# Patient Record
Sex: MALE | Race: WHITE | NOT HISPANIC OR LATINO | URBAN - METROPOLITAN AREA
[De-identification: names, ages, dates, MRNs, and addresses within clinical notes are randomized per-mention and may not be internally consistent; named-entity substitution may affect disease eponyms.]

---

## 2019-04-04 ENCOUNTER — COMMUNICATION - HEALTHEAST (OUTPATIENT)
Dept: TELEHEALTH | Facility: CLINIC | Age: 59
End: 2019-04-04

## 2019-04-04 ENCOUNTER — OFFICE VISIT - HEALTHEAST (OUTPATIENT)
Dept: FAMILY MEDICINE | Facility: CLINIC | Age: 59
End: 2019-04-04

## 2019-04-04 ENCOUNTER — RECORDS - HEALTHEAST (OUTPATIENT)
Dept: GENERAL RADIOLOGY | Facility: CLINIC | Age: 59
End: 2019-04-04

## 2019-04-04 DIAGNOSIS — C18.9 MALIGNANT NEOPLASM OF COLON, UNSPECIFIED PART OF COLON (H): ICD-10-CM

## 2019-04-04 DIAGNOSIS — M25.512 ACUTE PAIN OF LEFT SHOULDER: ICD-10-CM

## 2019-04-04 DIAGNOSIS — M25.512 PAIN IN LEFT SHOULDER: ICD-10-CM

## 2019-04-04 DIAGNOSIS — M25.532 PAIN IN LEFT WRIST: ICD-10-CM

## 2019-04-04 DIAGNOSIS — M79.642 PAIN IN LEFT HAND: ICD-10-CM

## 2019-04-04 DIAGNOSIS — M79.642 PAIN OF LEFT HAND: ICD-10-CM

## 2019-04-04 DIAGNOSIS — M25.532 LEFT WRIST PAIN: ICD-10-CM

## 2019-04-04 ASSESSMENT — MIFFLIN-ST. JEOR: SCORE: 1404.59

## 2019-07-09 ENCOUNTER — RECORDS - HEALTHEAST (OUTPATIENT)
Dept: LAB | Facility: CLINIC | Age: 59
End: 2019-07-09

## 2019-07-09 LAB
ALBUMIN SERPL-MCNC: 3.4 G/DL (ref 3.5–5)
ALP SERPL-CCNC: 110 U/L (ref 45–120)
ALT SERPL W P-5'-P-CCNC: <9 U/L (ref 0–45)
ANION GAP SERPL CALCULATED.3IONS-SCNC: 17 MMOL/L (ref 5–18)
AST SERPL W P-5'-P-CCNC: 13 U/L (ref 0–40)
BILIRUB SERPL-MCNC: 0.3 MG/DL (ref 0–1)
BUN SERPL-MCNC: 16 MG/DL (ref 8–22)
CALCIUM SERPL-MCNC: 9.8 MG/DL (ref 8.5–10.5)
CCP AB SER IA-ACNC: >1200 U/ML
CHLORIDE BLD-SCNC: 102 MMOL/L (ref 98–107)
CO2 SERPL-SCNC: 16 MMOL/L (ref 22–31)
CREAT SERPL-MCNC: 0.86 MG/DL (ref 0.7–1.3)
ERYTHROCYTE [SEDIMENTATION RATE] IN BLOOD BY WESTERGREN METHOD: 66 MM/HR (ref 0–15)
GFR SERPL CREATININE-BSD FRML MDRD: >60 ML/MIN/1.73M2
GLUCOSE BLD-MCNC: 82 MG/DL (ref 70–125)
POTASSIUM BLD-SCNC: 4.4 MMOL/L (ref 3.5–5)
PROT SERPL-MCNC: 7.4 G/DL (ref 6–8)
PSA SERPL-MCNC: 0.3 NG/ML (ref 0–3.5)
SODIUM SERPL-SCNC: 135 MMOL/L (ref 136–145)
TSH SERPL DL<=0.005 MIU/L-ACNC: 1.33 UIU/ML (ref 0.3–5)
VIT B12 SERPL-MCNC: 428 PG/ML (ref 213–816)

## 2019-07-10 LAB — B BURGDOR IGG+IGM SER QL: 0.22 INDEX VALUE

## 2019-07-11 ENCOUNTER — RECORDS - HEALTHEAST (OUTPATIENT)
Dept: LAB | Facility: CLINIC | Age: 59
End: 2019-07-11

## 2019-07-11 LAB
C REACTIVE PROTEIN LHE: 6.3 MG/DL (ref 0–0.8)
IRON SATN MFR SERPL: 13 % (ref 20–50)
IRON SERPL-MCNC: 27 UG/DL (ref 42–175)
RHEUMATOID FACT SERPL-ACNC: 80.5 IU/ML (ref 0–30)
TIBC SERPL-MCNC: 214 UG/DL (ref 313–563)
TRANSFERRIN SERPL-MCNC: 171 MG/DL (ref 212–360)

## 2021-05-24 ENCOUNTER — RECORDS - HEALTHEAST (OUTPATIENT)
Dept: ADMINISTRATIVE | Facility: CLINIC | Age: 61
End: 2021-05-24

## 2021-05-27 ENCOUNTER — RECORDS - HEALTHEAST (OUTPATIENT)
Dept: ADMINISTRATIVE | Facility: CLINIC | Age: 61
End: 2021-05-27

## 2021-05-27 NOTE — PROGRESS NOTES
Assessment and Plan:   1. Acute pain of left shoulder  XR Shoulder Left 2 or More VWS    Ambulatory referral to Orthopedics   2. Left wrist pain  XR Wrist Left 3 or More VWS    Ambulatory referral to Orthopedics   3. Pain of left hand  XR Hand Left 3 or More VWS    Ambulatory referral to Orthopedics   4. Malignant neoplasm of colon, unspecified part of colon (H)       Differentials include carpal tunnel syndrome or nerve entrapment within the elbow or shoulder.  Discussed symptomatic treatment including rest, ice, NSAIDs.  Patient is not interested in pain medication today.  Will refer to orthopedics for further evaluation and possible EMG.  Provided note stating that patient was seen in clinic today.  He is content with the plan.      Subjective:     Liborio is a 58 y.o. male presenting to the clinic for concerns for left wrist and hand pain.  Patient states yesterday at work, he developed left wrist pain over lunch.  By the end of the shift, he developed hand pain.  Patient states he cannot make a fist.  His wrist appears swollen.  He denies any known injury.  He works as a .  He does have some pain within his left shoulder which developed 2 weeks ago.  He describes the pain as an intermittent ache which is exacerbated with movement.  He denies any known injury to the shoulder.  He has been icing the area and taking ibuprofen.  He does have some slight discomfort within his left upper arm.  He has numbness and tingling within his fingers, but his 5th finger is spared.     He has a history of colon cancer.  He has a colostomy bag due to this.     Review of Systems: A complete 14 point review of systems was obtained and is negative or as stated in the history of present illness.    Social History     Socioeconomic History     Marital status:      Spouse name: Not on file     Number of children: Not on file     Years of education: Not on file     Highest education level: Not on file   Occupational  "History     Not on file   Social Needs     Financial resource strain: Not on file     Food insecurity:     Worry: Not on file     Inability: Not on file     Transportation needs:     Medical: Not on file     Non-medical: Not on file   Tobacco Use     Smoking status: Current Every Day Smoker     Packs/day: 0.50     Smokeless tobacco: Never Used   Substance and Sexual Activity     Alcohol use: Yes     Alcohol/week: 14.4 oz     Types: 24 Cans of beer per week     Drug use: No     Sexual activity: Not on file     Comment:    Lifestyle     Physical activity:     Days per week: Not on file     Minutes per session: Not on file     Stress: Not on file   Relationships     Social connections:     Talks on phone: Not on file     Gets together: Not on file     Attends Evangelical service: Not on file     Active member of club or organization: Not on file     Attends meetings of clubs or organizations: Not on file     Relationship status: Not on file     Intimate partner violence:     Fear of current or ex partner: Not on file     Emotionally abused: Not on file     Physically abused: Not on file     Forced sexual activity: Not on file   Other Topics Concern     Not on file   Social History Narrative     Not on file       Active Ambulatory Problems     Diagnosis Date Noted     No Active Ambulatory Problems     Resolved Ambulatory Problems     Diagnosis Date Noted     No Resolved Ambulatory Problems     Past Medical History:   Diagnosis Date     Colon cancer (H)        Family History   Problem Relation Age of Onset     Asthma Mother      Asthma Sister      Stroke Maternal Grandfather        Objective:     /70   Pulse 70   Ht 5' 6\" (1.676 m)   Wt 141 lb 8 oz (64.2 kg)   SpO2 98%   BMI 22.84 kg/m      Patient is alert, in no obvious distress.   Skin: Warm, dry.  Lungs:  Clear to auscultation. Respirations even and unlabored.  No wheezing or rales noted.   Heart:  Regular rate and rhythm.  No " murmurs.  Musculoskeletal:  He has full ROM of his shoulders, elbows, wrists.  He is tender to palpation of the middle of the palm of his hand.  There are no areas of erythema, ecchymosis, signs of trauma.  He is unable to fully flex his 1st-5th MCP joints in order to make a fist.  He is tender to palpation of his mid wrist dorsally.  He is tender to palpation of his left shoulder AC joint.  Muscle strength of upper extremities against resistance +5/5.     LABORATORY: I ordered and personally reviewed left wrist and hand x-rays showing no obvious fracture.   I ordered and personally reviewed a left shoulder x-ray showing mild spurring inferiorly at the AC joint.  Will have radiology review.

## 2021-05-31 ENCOUNTER — RECORDS - HEALTHEAST (OUTPATIENT)
Dept: ADMINISTRATIVE | Facility: CLINIC | Age: 61
End: 2021-05-31

## 2021-06-01 ENCOUNTER — RECORDS - HEALTHEAST (OUTPATIENT)
Dept: ADMINISTRATIVE | Facility: CLINIC | Age: 61
End: 2021-06-01

## 2021-06-02 VITALS — WEIGHT: 141.5 LBS | BODY MASS INDEX: 22.74 KG/M2 | HEIGHT: 66 IN

## 2021-06-16 PROBLEM — C18.9 COLON CANCER (H): Status: ACTIVE | Noted: 2019-04-04

## 2021-06-19 NOTE — LETTER
Letter by Leny Luo CNP at      Author: Leny Luo CNP Service: -- Author Type: --    Filed:  Encounter Date: 4/4/2019 Status: (Other)         April 4, 2019     Patient: Liborio Finch   YOB: 1960   Date of Visit: 4/4/2019       To Whom it May Concern:    Liborio Finch was seen in my clinic on 4/4/2019.     If you have any questions or concerns, please don't hesitate to call.    Sincerely,         Electronically signed by Leny Luo CNP

## 2022-06-13 ENCOUNTER — TRANSCRIBE ORDERS (OUTPATIENT)
Dept: OTHER | Age: 62
End: 2022-06-13

## 2022-06-13 DIAGNOSIS — Z95.5 STENTED CORONARY ARTERY: ICD-10-CM

## 2022-06-13 DIAGNOSIS — I21.3 ST ELEVATION MYOCARDIAL INFARCTION (STEMI), UNSPECIFIED ARTERY (H): Primary | ICD-10-CM

## 2025-05-06 ENCOUNTER — HOSPITAL ENCOUNTER (INPATIENT)
Facility: CLINIC | Age: 65
LOS: 2 days | Discharge: ANOTHER HOSPITAL | End: 2025-05-08
Attending: EMERGENCY MEDICINE | Admitting: STUDENT IN AN ORGANIZED HEALTH CARE EDUCATION/TRAINING PROGRAM
Payer: COMMERCIAL

## 2025-05-06 ENCOUNTER — APPOINTMENT (OUTPATIENT)
Dept: RADIOLOGY | Facility: CLINIC | Age: 65
End: 2025-05-06
Attending: EMERGENCY MEDICINE
Payer: COMMERCIAL

## 2025-05-06 ENCOUNTER — HOSPITAL ENCOUNTER (EMERGENCY)
Facility: HOSPITAL | Age: 65
End: 2025-05-06

## 2025-05-06 DIAGNOSIS — Z71.89 OSTOMY NURSE CONSULTATION: ICD-10-CM

## 2025-05-06 DIAGNOSIS — I20.0 UNSTABLE ANGINA PECTORIS (H): Primary | ICD-10-CM

## 2025-05-06 DIAGNOSIS — I20.0 UNSTABLE ANGINA (H): ICD-10-CM

## 2025-05-06 PROBLEM — M06.9 RHEUMATOID ARTHRITIS, ADULT (H): Status: ACTIVE | Noted: 2022-05-29

## 2025-05-06 PROBLEM — E78.5 DYSLIPIDEMIA: Status: ACTIVE | Noted: 2022-06-20

## 2025-05-06 PROBLEM — H93.13 BILATERAL TINNITUS: Status: ACTIVE | Noted: 2022-05-29

## 2025-05-06 PROBLEM — Z93.3 COLOSTOMY STATUS (H): Status: ACTIVE | Noted: 2023-05-19

## 2025-05-06 PROBLEM — Z85.038 HISTORY OF COLON CANCER: Status: ACTIVE | Noted: 2022-05-29

## 2025-05-06 PROBLEM — I25.2 HISTORY OF ST ELEVATION MYOCARDIAL INFARCTION (STEMI): Status: ACTIVE | Noted: 2022-05-29

## 2025-05-06 PROBLEM — R51.9 FREQUENT HEADACHES: Status: ACTIVE | Noted: 2022-05-29

## 2025-05-06 PROBLEM — I25.10 CORONARY ATHEROSCLEROSIS: Status: ACTIVE | Noted: 2022-06-20

## 2025-05-06 PROBLEM — I73.9 PERIPHERAL ARTERIAL DISEASE: Status: ACTIVE | Noted: 2022-08-31

## 2025-05-06 PROBLEM — Z95.5 STENTED CORONARY ARTERY: Status: ACTIVE | Noted: 2022-05-29

## 2025-05-06 LAB
ALBUMIN SERPL BCG-MCNC: 4.3 G/DL (ref 3.5–5.2)
ALP SERPL-CCNC: 109 U/L (ref 40–150)
ALT SERPL W P-5'-P-CCNC: 10 U/L (ref 0–70)
ANION GAP SERPL CALCULATED.3IONS-SCNC: 13 MMOL/L (ref 7–15)
AST SERPL W P-5'-P-CCNC: 21 U/L (ref 0–45)
BASOPHILS # BLD AUTO: 0.1 10E3/UL (ref 0–0.2)
BASOPHILS NFR BLD AUTO: 1 %
BILIRUB SERPL-MCNC: 0.3 MG/DL
BUN SERPL-MCNC: 14.7 MG/DL (ref 8–23)
CALCIUM SERPL-MCNC: 8.9 MG/DL (ref 8.8–10.4)
CHLORIDE SERPL-SCNC: 104 MMOL/L (ref 98–107)
CREAT SERPL-MCNC: 1.01 MG/DL (ref 0.67–1.17)
CRP SERPL-MCNC: 7.25 MG/L
EGFRCR SERPLBLD CKD-EPI 2021: 84 ML/MIN/1.73M2
EOSINOPHIL # BLD AUTO: 0.2 10E3/UL (ref 0–0.7)
EOSINOPHIL NFR BLD AUTO: 3 %
ERYTHROCYTE [DISTWIDTH] IN BLOOD BY AUTOMATED COUNT: 15 % (ref 10–15)
ERYTHROCYTE [DISTWIDTH] IN BLOOD BY AUTOMATED COUNT: 15.2 % (ref 10–15)
GLUCOSE SERPL-MCNC: 114 MG/DL (ref 70–99)
HCO3 SERPL-SCNC: 20 MMOL/L (ref 22–29)
HCT VFR BLD AUTO: 36.4 % (ref 40–53)
HCT VFR BLD AUTO: 36.5 % (ref 40–53)
HGB BLD-MCNC: 12.2 G/DL (ref 13.3–17.7)
HGB BLD-MCNC: 12.5 G/DL (ref 13.3–17.7)
IMM GRANULOCYTES # BLD: 0 10E3/UL
IMM GRANULOCYTES NFR BLD: 1 %
LYMPHOCYTES # BLD AUTO: 1.4 10E3/UL (ref 0.8–5.3)
LYMPHOCYTES NFR BLD AUTO: 23 %
MAGNESIUM SERPL-MCNC: 1.9 MG/DL (ref 1.7–2.3)
MCH RBC QN AUTO: 30.6 PG (ref 26.5–33)
MCH RBC QN AUTO: 31 PG (ref 26.5–33)
MCHC RBC AUTO-ENTMCNC: 33.5 G/DL (ref 31.5–36.5)
MCHC RBC AUTO-ENTMCNC: 34.2 G/DL (ref 31.5–36.5)
MCV RBC AUTO: 91 FL (ref 78–100)
MCV RBC AUTO: 91 FL (ref 78–100)
MONOCYTES # BLD AUTO: 0.4 10E3/UL (ref 0–1.3)
MONOCYTES NFR BLD AUTO: 7 %
NEUTROPHILS # BLD AUTO: 3.8 10E3/UL (ref 1.6–8.3)
NEUTROPHILS NFR BLD AUTO: 65 %
NRBC # BLD AUTO: 0 10E3/UL
NRBC BLD AUTO-RTO: 0 /100
PLATELET # BLD AUTO: 149 10E3/UL (ref 150–450)
PLATELET # BLD AUTO: 158 10E3/UL (ref 150–450)
POTASSIUM SERPL-SCNC: 3.6 MMOL/L (ref 3.4–5.3)
PROT SERPL-MCNC: 7.2 G/DL (ref 6.4–8.3)
RBC # BLD AUTO: 3.99 10E6/UL (ref 4.4–5.9)
RBC # BLD AUTO: 4.03 10E6/UL (ref 4.4–5.9)
SODIUM SERPL-SCNC: 137 MMOL/L (ref 135–145)
TROPONIN T SERPL HS-MCNC: 7 NG/L
TROPONIN T SERPL HS-MCNC: 8 NG/L
TSH SERPL DL<=0.005 MIU/L-ACNC: 4.08 UIU/ML (ref 0.3–4.2)
WBC # BLD AUTO: 5.9 10E3/UL (ref 4–11)
WBC # BLD AUTO: 6.8 10E3/UL (ref 4–11)

## 2025-05-06 PROCEDURE — 82565 ASSAY OF CREATININE: CPT | Performed by: STUDENT IN AN ORGANIZED HEALTH CARE EDUCATION/TRAINING PROGRAM

## 2025-05-06 PROCEDURE — 96361 HYDRATE IV INFUSION ADD-ON: CPT

## 2025-05-06 PROCEDURE — 250N000013 HC RX MED GY IP 250 OP 250 PS 637

## 2025-05-06 PROCEDURE — 250N000011 HC RX IP 250 OP 636

## 2025-05-06 PROCEDURE — 84443 ASSAY THYROID STIM HORMONE: CPT | Performed by: STUDENT IN AN ORGANIZED HEALTH CARE EDUCATION/TRAINING PROGRAM

## 2025-05-06 PROCEDURE — 86140 C-REACTIVE PROTEIN: CPT

## 2025-05-06 PROCEDURE — 83735 ASSAY OF MAGNESIUM: CPT | Performed by: STUDENT IN AN ORGANIZED HEALTH CARE EDUCATION/TRAINING PROGRAM

## 2025-05-06 PROCEDURE — 93005 ELECTROCARDIOGRAM TRACING: CPT | Performed by: EMERGENCY MEDICINE

## 2025-05-06 PROCEDURE — 120N000004 HC R&B MS OVERFLOW

## 2025-05-06 PROCEDURE — 71045 X-RAY EXAM CHEST 1 VIEW: CPT

## 2025-05-06 PROCEDURE — 99291 CRITICAL CARE FIRST HOUR: CPT | Mod: 25

## 2025-05-06 PROCEDURE — 85025 COMPLETE CBC W/AUTO DIFF WBC: CPT | Performed by: STUDENT IN AN ORGANIZED HEALTH CARE EDUCATION/TRAINING PROGRAM

## 2025-05-06 PROCEDURE — 250N000011 HC RX IP 250 OP 636: Mod: JZ | Performed by: EMERGENCY MEDICINE

## 2025-05-06 PROCEDURE — 96375 TX/PRO/DX INJ NEW DRUG ADDON: CPT

## 2025-05-06 PROCEDURE — 250N000013 HC RX MED GY IP 250 OP 250 PS 637: Performed by: STUDENT IN AN ORGANIZED HEALTH CARE EDUCATION/TRAINING PROGRAM

## 2025-05-06 PROCEDURE — 36415 COLL VENOUS BLD VENIPUNCTURE: CPT

## 2025-05-06 PROCEDURE — 258N000003 HC RX IP 258 OP 636: Performed by: EMERGENCY MEDICINE

## 2025-05-06 PROCEDURE — 96374 THER/PROPH/DIAG INJ IV PUSH: CPT

## 2025-05-06 PROCEDURE — 85041 AUTOMATED RBC COUNT: CPT

## 2025-05-06 PROCEDURE — 84484 ASSAY OF TROPONIN QUANT: CPT | Performed by: STUDENT IN AN ORGANIZED HEALTH CARE EDUCATION/TRAINING PROGRAM

## 2025-05-06 PROCEDURE — 36415 COLL VENOUS BLD VENIPUNCTURE: CPT | Performed by: STUDENT IN AN ORGANIZED HEALTH CARE EDUCATION/TRAINING PROGRAM

## 2025-05-06 RX ORDER — METOCLOPRAMIDE HYDROCHLORIDE 5 MG/ML
10 INJECTION INTRAMUSCULAR; INTRAVENOUS ONCE
Status: COMPLETED | OUTPATIENT
Start: 2025-05-06 | End: 2025-05-06

## 2025-05-06 RX ORDER — LIDOCAINE 40 MG/G
CREAM TOPICAL
Status: CANCELLED | OUTPATIENT
Start: 2025-05-06

## 2025-05-06 RX ORDER — ACETAMINOPHEN 325 MG/1
975 TABLET ORAL EVERY 8 HOURS PRN
Status: CANCELLED | OUTPATIENT
Start: 2025-05-06

## 2025-05-06 RX ORDER — ASPIRIN 325 MG
325 TABLET ORAL ONCE
Status: COMPLETED | OUTPATIENT
Start: 2025-05-06 | End: 2025-05-06

## 2025-05-06 RX ORDER — MAGNESIUM OXIDE 400 MG/1
400 TABLET ORAL DAILY
COMMUNITY

## 2025-05-06 RX ORDER — POLYETHYLENE GLYCOL 3350 17 G/17G
17 POWDER, FOR SOLUTION ORAL 2 TIMES DAILY PRN
Status: CANCELLED | OUTPATIENT
Start: 2025-05-06

## 2025-05-06 RX ORDER — DIPHENHYDRAMINE HYDROCHLORIDE 50 MG/ML
12.5 INJECTION, SOLUTION INTRAMUSCULAR; INTRAVENOUS ONCE
Status: COMPLETED | OUTPATIENT
Start: 2025-05-06 | End: 2025-05-06

## 2025-05-06 RX ORDER — NITROGLYCERIN 0.4 MG/1
0.4 TABLET SUBLINGUAL EVERY 5 MIN PRN
Status: DISCONTINUED | OUTPATIENT
Start: 2025-05-06 | End: 2025-05-06

## 2025-05-06 RX ORDER — SULFASALAZINE 500 MG/1
1500 TABLET ORAL 2 TIMES DAILY
Status: CANCELLED | OUTPATIENT
Start: 2025-05-06

## 2025-05-06 RX ORDER — CLOPIDOGREL BISULFATE 75 MG/1
75 TABLET ORAL DAILY
COMMUNITY

## 2025-05-06 RX ORDER — LOSARTAN POTASSIUM 25 MG/1
25 TABLET ORAL DAILY
Status: DISCONTINUED | OUTPATIENT
Start: 2025-05-06 | End: 2025-05-08 | Stop reason: HOSPADM

## 2025-05-06 RX ORDER — ASPIRIN 81 MG/1
81 TABLET ORAL DAILY
Status: CANCELLED | OUTPATIENT
Start: 2025-05-07

## 2025-05-06 RX ORDER — SULFASALAZINE 500 MG/1
1500 TABLET ORAL 2 TIMES DAILY
Status: DISCONTINUED | OUTPATIENT
Start: 2025-05-06 | End: 2025-05-08 | Stop reason: HOSPADM

## 2025-05-06 RX ORDER — CLOPIDOGREL BISULFATE 75 MG/1
75 TABLET ORAL DAILY
Status: CANCELLED | OUTPATIENT
Start: 2025-05-07

## 2025-05-06 RX ORDER — ASPIRIN 81 MG/1
81 TABLET ORAL DAILY
COMMUNITY

## 2025-05-06 RX ORDER — MAGNESIUM OXIDE 400 MG/1
400 TABLET ORAL DAILY
Status: CANCELLED | OUTPATIENT
Start: 2025-05-07

## 2025-05-06 RX ORDER — SULFASALAZINE 500 MG/1
1500 TABLET ORAL 2 TIMES DAILY
COMMUNITY

## 2025-05-06 RX ORDER — HEPARIN SODIUM 10000 [USP'U]/100ML
0-5000 INJECTION, SOLUTION INTRAVENOUS CONTINUOUS
Status: DISCONTINUED | OUTPATIENT
Start: 2025-05-06 | End: 2025-05-08 | Stop reason: HOSPADM

## 2025-05-06 RX ORDER — NITROGLYCERIN 0.4 MG/1
0.4 TABLET SUBLINGUAL ONCE
Status: COMPLETED | OUTPATIENT
Start: 2025-05-06 | End: 2025-05-06

## 2025-05-06 RX ORDER — AMOXICILLIN 250 MG
2 CAPSULE ORAL 2 TIMES DAILY PRN
Status: CANCELLED | OUTPATIENT
Start: 2025-05-06

## 2025-05-06 RX ORDER — CALCIUM CARBONATE 500 MG/1
1000 TABLET, CHEWABLE ORAL 4 TIMES DAILY PRN
Status: CANCELLED | OUTPATIENT
Start: 2025-05-06

## 2025-05-06 RX ORDER — AMOXICILLIN 250 MG
1 CAPSULE ORAL 2 TIMES DAILY PRN
Status: CANCELLED | OUTPATIENT
Start: 2025-05-06

## 2025-05-06 RX ADMIN — SODIUM CHLORIDE 500 ML: 0.9 INJECTION, SOLUTION INTRAVENOUS at 12:40

## 2025-05-06 RX ADMIN — SULFASALAZINE 1500 MG: 500 TABLET ORAL at 22:27

## 2025-05-06 RX ADMIN — NITROGLYCERIN 0.4 MG: 0.4 TABLET SUBLINGUAL at 12:27

## 2025-05-06 RX ADMIN — HEPARIN SODIUM 750 UNITS/HR: 10000 INJECTION, SOLUTION INTRAVENOUS at 19:16

## 2025-05-06 RX ADMIN — LOSARTAN POTASSIUM 25 MG: 25 TABLET, FILM COATED ORAL at 21:22

## 2025-05-06 RX ADMIN — DIPHENHYDRAMINE HYDROCHLORIDE 12.5 MG: 50 INJECTION, SOLUTION INTRAMUSCULAR; INTRAVENOUS at 12:43

## 2025-05-06 RX ADMIN — NITROGLYCERIN 0.4 MG: 0.4 TABLET, ORALLY DISINTEGRATING SUBLINGUAL at 19:17

## 2025-05-06 RX ADMIN — ASPIRIN 325 MG ORAL TABLET 325 MG: 325 PILL ORAL at 12:28

## 2025-05-06 RX ADMIN — METOCLOPRAMIDE 10 MG: 5 INJECTION, SOLUTION INTRAMUSCULAR; INTRAVENOUS at 12:43

## 2025-05-06 ASSESSMENT — ACTIVITIES OF DAILY LIVING (ADL)
ADLS_ACUITY_SCORE: 41
ADLS_ACUITY_SCORE: 42
ADLS_ACUITY_SCORE: 41
ADLS_ACUITY_SCORE: 41
ADLS_ACUITY_SCORE: 42

## 2025-05-06 ASSESSMENT — COLUMBIA-SUICIDE SEVERITY RATING SCALE - C-SSRS
1. IN THE PAST MONTH, HAVE YOU WISHED YOU WERE DEAD OR WISHED YOU COULD GO TO SLEEP AND NOT WAKE UP?: NO
6. HAVE YOU EVER DONE ANYTHING, STARTED TO DO ANYTHING, OR PREPARED TO DO ANYTHING TO END YOUR LIFE?: NO
2. HAVE YOU ACTUALLY HAD ANY THOUGHTS OF KILLING YOURSELF IN THE PAST MONTH?: NO

## 2025-05-06 NOTE — ED PROVIDER NOTES
Emergency Department Midlevel Supervisory Note     I personally saw the patient and performed a substantive portion of the visit including all aspects of the medical decision making.    ED Course:  12:15 PM Tanvi Dumont PA-C staffed patient with me. I agree with their assessment and plan of management, and I will see the patient.  12:20 PM I met with the patient to introduce myself, gather additional history, perform my initial exam, and discuss the plan.     Brief HPI:     Liboiro Finch is a 64 year old male who presents for evaluation of chest pain. Chest pain present for the past 24 hours. He has not been taking his home medications except for Plavix.     I, Jeane Miller, am serving as a scribe to document services personally performed by Charles Cook MD, based on my observations and the provider's statements to me.   I, Charles Cook MD, attest that Jeane Miller was acting in a scribe capacity, has observed my performance of the services and has documented them in accordance with my direction.    Brief Physical Exam:  Constitutional:  Alert, in no acute distress  EYES: Conjunctivae clear  HENT:  Atraumatic, normocephalic  Respiratory:  Respirations even, unlabored, in no acute respiratory distress  Cardiovascular:  Regular rate and rhythm, good peripheral perfusion  GI: Soft, nondistended, nontender, no palpable masses, no rebound, no guarding   Musculoskeletal:  No edema. No cyanosis. Range of motion major extremities intact.    Integument: Warm, Dry, No erythema, No rash.   Neurologic:  Alert & oriented, no focal deficits noted  Psych: Normal mood and affect     MDM:  ED Course as of 05/06/25 1545   Tue May 06, 2025   1215 JUSTIN supervisory note: 63 yo M who presents with chest pain for about 24 hours. Not taking home meds aside from plavix. Hypertensive on arrival at 194/93.  EKG shows mild ST depression in leads V3 V4, no ST elevation.  Prior EKG from earlier today at [facility] was also  reviewed and shows slightly more pronounced ST depression in these leads.  High suspicion for ACS at this time.   1341 Chest pain decreased from 3 down to 1 after dose of nitroglycerin.   1342 EKG today shows sinus rhythm rate of 75.  Faint ST depression in V3, V4 without reciprocal ST elevation.  Normal axis, normal intervals.  When compared to prior EKG in our system on August 10, 2000, these changes in lead V3 V4 are new.  Impression: Sinus rhythm with nonspecific ST changes anterior leads.   1342 When reviewing patient's EKG from earlier today at [facility], the mild ST depression in V3 and V4 more pronounced.  I will add image to the chart.   3:45 PM I spoke to Dr. Beard with cardiology. Pt's presentation is borderline but concerning for ACS/unstable angina. Plan to admit to facility with cath lab. Start heparin if chest pain returns.        1. Unstable angina (H)        Labs and Imaging:  Results for orders placed or performed during the hospital encounter of 05/06/25   XR Chest Port 1 View    Impression    IMPRESSION:     Heart size is normal. Lungs are clear bilaterally. Mediastinum and visualized bony structures are unremarkable.    Result Value Ref Range    Troponin T, High Sensitivity 8 <=22 ng/L   Result Value Ref Range    Magnesium 1.9 1.7 - 2.3 mg/dL   Comprehensive metabolic panel   Result Value Ref Range    Sodium 137 135 - 145 mmol/L    Potassium 3.6 3.4 - 5.3 mmol/L    Carbon Dioxide (CO2) 20 (L) 22 - 29 mmol/L    Anion Gap 13 7 - 15 mmol/L    Urea Nitrogen 14.7 8.0 - 23.0 mg/dL    Creatinine 1.01 0.67 - 1.17 mg/dL    GFR Estimate 84 >60 mL/min/1.73m2    Calcium 8.9 8.8 - 10.4 mg/dL    Chloride 104 98 - 107 mmol/L    Glucose 114 (H) 70 - 99 mg/dL    Alkaline Phosphatase 109 40 - 150 U/L    AST 21 0 - 45 U/L    ALT 10 0 - 70 U/L    Protein Total 7.2 6.4 - 8.3 g/dL    Albumin 4.3 3.5 - 5.2 g/dL    Bilirubin Total 0.3 <=1.2 mg/dL   TSH with free T4 reflex   Result Value Ref Range    TSH 4.08 0.30 -  4.20 uIU/mL   CBC with platelets and differential   Result Value Ref Range    WBC Count 5.9 4.0 - 11.0 10e3/uL    RBC Count 3.99 (L) 4.40 - 5.90 10e6/uL    Hemoglobin 12.2 (L) 13.3 - 17.7 g/dL    Hematocrit 36.4 (L) 40.0 - 53.0 %    MCV 91 78 - 100 fL    MCH 30.6 26.5 - 33.0 pg    MCHC 33.5 31.5 - 36.5 g/dL    RDW 15.2 (H) 10.0 - 15.0 %    Platelet Count 149 (L) 150 - 450 10e3/uL    % Neutrophils 65 %    % Lymphocytes 23 %    % Monocytes 7 %    % Eosinophils 3 %    % Basophils 1 %    % Immature Granulocytes 1 %    NRBCs per 100 WBC 0 <1 /100    Absolute Neutrophils 3.8 1.6 - 8.3 10e3/uL    Absolute Lymphocytes 1.4 0.8 - 5.3 10e3/uL    Absolute Monocytes 0.4 0.0 - 1.3 10e3/uL    Absolute Eosinophils 0.2 0.0 - 0.7 10e3/uL    Absolute Basophils 0.1 0.0 - 0.2 10e3/uL    Absolute Immature Granulocytes 0.0 <=0.4 10e3/uL    Absolute NRBCs 0.0 10e3/uL   Result Value Ref Range    Troponin T, High Sensitivity 7 <=22 ng/L     I have reviewed the relevant laboratory and radiology studies    Procedures:  I was present for the key portions of this procedure: none      Critical Care     Performed by: Dr Linwood Cook  Authorized by: Dr Linwood Cook  Total critical care time: 60 minutes  Critical care was necessary to treat or prevent imminent or life-threatening deterioration of the following conditions: unstable angina/ACS requiring close cardiovascular monitoring, repeat ECGs and transfer to higher level of care.    Critical care was time spent personally by me on the following activities: development of treatment plan with patient or surrogate, discussions with consultants, examination of patient, evaluation of patient's response to treatment, obtaining history from patient or surrogate, ordering and performing treatments and interventions, ordering and review of laboratory studies, ordering and review of radiographic studies, re-evaluation of patient's condition and monitoring for potential decompensation.  Critical care  time was exclusive of separately billable procedures and treating other patients.      Charles Cook MD  [facility] EMERGENCY ROOM  [address] [address] [phone]       Charles Cook MD  05/06/25 1642

## 2025-05-06 NOTE — H&P
[facility]    History and Physical - Hospitalist Service       Date of Admission:  5/6/2025    Assessment & Plan      Liborio Finch is a 64 year old male who has a history of CAD, STEMI 2022 s/p PCI LAD, ischemic cardiomyopathy LVEF greater than 60% 5/2023, HTN, HLD, PAD s/p left femoral endarterectomy and iliac stent 10/2022, RA, colon cancer s/p colostomy in remission, former tobacco use, and is admitted for unstable angina.    Unstable angina  CAD  STEMI 2022 s/p PCI LAD  Ischemic cardiomyopathy LVEF >60% 5/2023  HTN  HLD  Presented to urgent care with 3 days of chest pain, EKG showed ST depression V3 V4 however this was not appreciated on EKG performed in the ED.  ED provider spoke with cardiologist Dr. Beard who agreed there is a risk that symptoms are consistent with unstable angina would recommend coronary angiography at [facility].  No beds available, will admit to [facility] and await bed availability.  Should chest pain return, he recommended starting heparin GTT.   Of note, patient stopped taking his antihypertensives a few months ago which included losartan 25 mg metoprolol succinate 25 mg.  Lipid panel 1/2025 with , ASCVD 10yr risk w/o HTN treatment 18.3%. Would benefit from high intensity statin. Heart Score of 4.  - Admit to inpatient  - Cardiology consulted, appreciate expertise  - Plan to transfer to [facility] for coronary angio when bed is available  - Continue PTA Plavix, ASA  - Echocardiogram  - UA for evidence of EOD  - NTG sublingual as needed  - Heparin GTT  - Cardiac tele  - n.p.o. midnight  - Restart losartan 25mg      LLE pain  PAD s/p left femoral endarterectomy and iliac stent  - US duplex LLE, eval for DVT      Chronic conditions:  RA - Continue PTA sulfasalazine  Colon cancer s/p colostomy in remission - [facility]OC consult          Diet: 2 Gram Sodium Diet  DVT Prophylaxis: Heparin GTT  Garibay Catheter: Not present  Fluids: PO  Lines: None     Cardiac  Monitoring: Cardiac tele  Code Status: No CPR- Do NOT Intubate    Clinically Significant Risk Factors Present on Admission                 # Drug Induced Platelet Defect: home medication list includes an antiplatelet medication                        Disposition Plan      Expected Discharge Date: 05/07/2025                The patient's care was discussed with the Attending Physician, Dr. Lamine Diop . The patient will be seen in the morning by Dr. Christi Yarbrough.       Tod Landon, DO  Hospitalist Service  [facility]  Securely message with [employer] (more info)  Text page via [employer] Paging/Directory   ______________________________________________________________________    Chief Complaint   Chest pain    History is obtained from the patient    History of Present Illness   Liborio Finch is a 64 year old male who has a history of CAD, STEMI 2022 s/p PCI LAD, ischemic cardiomyopathy LVEF greater than 60% 5/2023, HTN, HLD, PAD s/p left femoral endarterectomy and iliac stent 10/2022, RA, colon cancer s/p colostomy in remission, former tobacco use, and is admitted for unstable angina.    Patient was helping his grandson move on 5/3, and at the end of the day felt malaise which progressed into a left-sided chest pain.  Chest pain has been constant in nature, he is unsure if it has been worsening with activity.  Does not radiate.  No fevers or chills.  No cough or shortness of breath.  This morning his left lower extremity pain had worsened beyond his baseline, and he was planning to discuss this with his primary care doctor and potentially get an ultrasound.  His chest pain had persisted and he was concerned that he would be having another heart attack so he decided to present to urgent care.  EKG performed in urgent care showed ST depression in V3, questionably in V4, they recommended he present to the ED.    He works as a  and lives with [place] with his wife.  Ambulatory at  baseline without assistive devices.  Non-smoker (quit 05/2022), no alcohol use.      Past Medical History    No past medical history on file.    Past Surgical History   Past Surgical History:   Procedure Laterality Date    APPENDECTOMY      COLON SURGERY      IR LOWER EXTREMITY ANGIOGRAM LEFT  10/13/2022       Prior to Admission Medications   Prior to Admission Medications   Prescriptions Last Dose Informant Patient Reported? Taking?   aspirin 81 MG EC tablet 5/6/2025 Morning  Yes Yes   Sig: Take 81 mg by mouth daily.   clopidogrel (PLAVIX) 75 MG tablet 5/6/2025 Morning  Yes Yes   Sig: Take 75 mg by mouth daily.   magnesium oxide (MAG-OX) 400 MG tablet 5/6/2025 Morning  Yes Yes   Sig: Take 400 mg by mouth daily.   omeprazole (PRILOSEC) 20 MG capsule 5/6/2025 Morning  Yes Yes   Sig: [OMEPRAZOLE (PRILOSEC) 20 MG CAPSULE] Take 20 mg by mouth daily before breakfast.   sulfaSALAzine (AZULFIDINE) 500 MG tablet 5/6/2025 Morning  Yes Yes   Sig: Take 1,500 mg by mouth 2 times daily.      Facility-Administered Medications: None           Physical Exam   Vital Signs: Temp: 98.3  F (36.8  C) Temp src: Oral BP: (!) 164/77 Pulse: 59   Resp: 19 SpO2: 97 % O2 Device: None (Room air)    Weight: 140 lbs 0 oz    Constitutional: Awake, alert, cooperative, no apparent distress, and appears stated age.  Eyes: Lids and lashes normal, extra ocular muscles intact, sclera clear, conjunctiva normal.  ENT: Normocephalic, atraumatic. Moist mucous membranes.  Hematologic / Lymphatic: No cervical lymphadenopathy.  Respiratory: No increased work of breathing, no accessory muscle use, clear to auscultation bilaterally, no crackles or wheezing.  Cardiovascular: Regular rate and rhythm, normal S1 and S2, no S3 or S4, and no murmur noted  GI: Abdomen soft, non-tender, non-distended, no masses palpated.  Skin: No bruising or bleeding and normal skin color, texture, turgor.  Musculoskeletal: Generalized LLE (lower leg) tenderness to palpation, no edema  or erythema.   Neurologic: Awake, alert, oriented to name, place and time. Sensation intact bilaterally.      Medical Decision Making     Please see A&P for additional details of medical decision making.      Data   ------------------------- PAST 24 HR DATA REVIEWED -----------------------------------------------    I have personally reviewed the following data over the past 24 hrs:    5.9  \   12.2 (L)   / 149 (L)     137 104 14.7 /  114 (H)   3.6 20 (L) 1.01 \     ALT: 10 AST: 21 AP: 109 TBILI: 0.3   ALB: 4.3 TOT PROTEIN: 7.2 LIPASE: N/A     Trop: 7 BNP: N/A     TSH: 4.08 T4: N/A A1C: N/A       Imaging results reviewed over the past 24 hrs:   Recent Results (from the past 24 hours)   XR Chest Port 1 View    Narrative    EXAM: XR CHEST PORT 1 VIEW  LOCATION: [facility]  DATE: 5/6/2025    INDICATION: chest pain  COMPARISON: 5/5/2023      Impression    IMPRESSION:     Heart size is normal. Lungs are clear bilaterally. Mediastinum and visualized bony structures are unremarkable.

## 2025-05-06 NOTE — ED NOTES
"[facility] ED Handoff Report    ED Chief Complaint: Chest pain    ED Diagnosis:  (I20.0) Unstable angina (H)  Comment: Was given one dose of sublingual nitroglycerin. CP returned this evening  Plan: Heparin drip. Cardiology consult. [facility] for coronary angio when bed is available.        PMH:  CAD, STEMI 2022 s/p PCI LAD, ischemic cardiomyopathy LVEF greater than 60% 5/2023, HTN, HLD, PAD s/p left femoral endarterectomy and iliac stent 10/2022, RA, colon cancer s/p colostomy in remission, former tobacco use, and is admitted for unstable angina     Code Status:  No Order     Falls Risk: No Band: Not applicable    Current Living Situation/Residence: lives with a significant other     Elimination Status: Continent: Yes     Activity Level: SBA    Patient's Preferred Language:  English     Needed: No    Vital Signs:  BP (!) 164/77   Pulse 59   Temp 98.3  F (36.8  C) (Oral)   Resp 19   Ht 1.676 m (5' 6\")   Wt 63.5 kg (140 lb)   SpO2 97%   BMI 22.60 kg/m       Cardiac Rhythm: Sinus    Pain Score: 3/10    Is the Patient Confused:  No    Last Food or Drink: 05/06/25 at 1900    Assessment and Plan of Care:  CP returned, so will be given a second dose of sublingual nitroglycerin, will start heparin drip. Plan is to transfer to [facility] for coronary angio when a bed is available. Cardiac monitoring    Tests Performed: Done: Labs and Imaging    Treatments Provided:  Sublingual nitroglycerin, cardiac monitoring, blood work    Family Dynamics/Concerns: No    Belongings Checklist Done and Signed by Patient: N/A    Belongings Sent with Patient: Clothes, phone    Boarding medications sent with patient: N/A    Additional Information: CP started on Sunday. Pt has cardiac hx, with stent placed three years ago. Carries nitroglycerin, but did not take any at home. CP improved after first dose of nitroglycerin. Pt cleared to eat, goes NPO at midnight.     RN: Ryan Gutierrez RN   5/6/2025 6:49 PM       "

## 2025-05-06 NOTE — PHARMACY-ADMISSION MEDICATION HISTORY
Pharmacist Admission Medication History    Admission medication history is complete. The information provided in this note is only as accurate as the sources available at the time of the update.    Information Source(s): Patient and CareEverywhere/SureScripts via in-person    Pertinent Information: None    Changes made to PTA medication list:  Added: Plavix, Sulfasalazine, Asa, Magnesium  Deleted: None  Changed: None    Allergies reviewed with patient and updates made in EHR: yes    Medication History Completed By: Rigoberto Ulloa RPH 5/6/2025 1:14 PM    PTA Med List   Medication Sig Last Dose/Taking    aspirin 81 MG EC tablet Take 81 mg by mouth daily. 5/6/2025 Morning    clopidogrel (PLAVIX) 75 MG tablet Take 75 mg by mouth daily. 5/6/2025 Morning    magnesium oxide (MAG-OX) 400 MG tablet Take 400 mg by mouth daily. 5/6/2025 Morning    sulfaSALAzine (AZULFIDINE) 500 MG tablet Take 1,500 mg by mouth 2 times daily. 5/6/2025 Morning    omeprazole (PRILOSEC) 20 MG capsule [OMEPRAZOLE (PRILOSEC) 20 MG CAPSULE] Take 20 mg by mouth daily before breakfast. 5/6/2025 Morning

## 2025-05-06 NOTE — ED TRIAGE NOTES
Pt reports chest pain that started on Sunday. States he has a hx of a heart attack with stent placement three years ago.      Triage Assessment (Adult)       Row Name 05/06/25 1159          Triage Assessment    Airway WDL WDL        Respiratory WDL    Respiratory WDL WDL        Skin Circulation/Temperature WDL    Skin Circulation/Temperature WDL WDL        Cardiac WDL    Cardiac WDL X;chest pain        Chest Pain Assessment    Chest Pain Location midsternal     Duration Sunday        Peripheral/Neurovascular WDL    Peripheral Neurovascular WDL WDL        Cognitive/Neuro/Behavioral WDL    Cognitive/Neuro/Behavioral WDL WDL

## 2025-05-06 NOTE — ED NOTES
Expected Patient Referral to ED  11:14 AM    Referring Clinic/Provider:  [facility]    Reason for referral/Clinical facts:  64 M with intermittent CP and SOB since Sunday. Not exertional. Has hx CAD s/p stent in 2022. Smoker.    Recommendations provided:  Send to ED for further evaluation    Caller was informed that this institution does possess the capabilities and/or resources to provide for patient and should be transferred to our facility.    Discussed that if direct admit is sought and any hurdles are encountered, this ED would be happy to see the patient and evaluate.    Informed caller that recommendations provided are recommendations based only on the facts provided and that they responsible to accept or reject the advice, or to seek a formal in person consultation as needed and that this ED will see/treat patient should they arrive.      Maye Kelly MD  [facility] EMERGENCY DEPARTMENT  [address] [place] [id number]  [phone]       Maye Kelly MD  05/06/25 8766

## 2025-05-06 NOTE — ED PROVIDER NOTES
"Emergency Department Encounter   NAME: Liborio Finch ; AGE: 64 year old male ; YOB: 1960 ; MRN: [id number] ; PCP: No primary care provider on file.   ED PROVIDER: Tanvi Dumont PA-C    Evaluation Date & Time:   5/6/2025 11:56 AM    CHIEF COMPLAINT:  Chest Pain        Impression and Plan   FINAL IMPRESSION:    ICD-10-CM    1. Unstable angina (H)  I20.0           ED Course and Medical Decision Making  Liborio Finch is a 64 year old male with a pertinent history of STEMI s/p PCI, RA, and unstable angina who presents to the ED for evaluation of chest pain. Pt states starting mid morning on Sunday (3 days ago) he started having mid/left sternal chest pressure.  Pain is not positional or pleuritic.  Pain is not made worse with exertion. He denies any radiation of pain and states this does not feel similar to previous STEMI as he had severe chest and arm pain at that time. Rates chest pain at 3/10. He was initially evaluated at [facility] urgent care and EKG showed mild ST depressions in V3 and V4 so he was sent to the ED. No meds given at urgent care.    Vitals reviewed, he is quite hypertensive with BP of 194/93. Pt was previously on antihypertensive medication but stopped almost all of his medications aside from clopidogrel as he \"did not want to be on that many medications\". Rest of vitals are unremarkable.  On exam he is resting comfortably in the bed, no acute distress. Not pale or diaphoretic. Differential diagnosis/emergent conditions considered and evaluated for includes but not limited to muscle strain, shingles, bronchitis, pneumonia, NSTEMI, unstable angina, PE, dissection. His heart rate is regular.  Radial pulses 2+ and equal bilaterally.  Lung sounds clear to auscultation. His chest pain is not reproducible on exam. No overlying skin changes of the chest.  No abdominal tenderness.  No back pain.  He was given a dose of sublingual nitroglycerin as well as 325 ASA for " symptoms.    Initial EKG upon arrival here to the emergency department shows very mild ST depressions in V3 and V4.  These are slightly improved when compared to EKG obtained in urgent care prior to arrival.  Initial troponin 8, repeat down trended to 7. No NSTEMI.  Lab work today is overall unremarkable and reassuring.  Recheck of pt after administration of nitrostat finds improvement in his chest discomfort from a 4/10 to  a 1/10.    Given patient's new mild ST depressions and improvement of chest pain with nitroglycerin his clinical picture today is concerning for unstable angina/ACS. Initial plan was to transfer patient to hospital with cardiac Cath Lab, however, no cards tele beds at hospitals with cath labs in system. Will admit here at [facility] and patient may need to be transferred to [facility] or other facility for cath if indicated. If chest pain returns, plan to start heparin.        Medical Decision Making  I obtained history from Family Member/Significant Other  I discussed the care with another health care provider: Dr. Cook, Dr. Beard Cardiology  Admit.    MIPS (CTPE, Dental pain, Garibay, Sinusitis, Asthma/COPD, Head Trauma): Not Applicable    SEPSIS: None        ED COURSE:  1202 I met and introduced myself to the patient. I gathered initial history and performed my physical exam. We discussed plan for initial workup.   1215 I have staffed the patient with Dr. Cook, ED MD, who has evaluated the patient and agrees with all aspects of today's care.   1753 I spoke with Dr. Thomas resident service here at [facility] and pt was accepted for admission    At the conclusion of the encounter I discussed the results of all the tests and the disposition. The questions were answered. The patient or family acknowledged understanding and was agreeable with the care plan.        MEDICATIONS GIVEN IN THE EMERGENCY DEPARTMENT:  Medications   aspirin (ASA) tablet 325 mg (325 mg Oral $Given 5/6/25 1228)  "  nitroGLYcerin (NITROSTAT) sublingual tablet 0.4 mg (0.4 mg Sublingual $Given 5/6/25 1227)   sodium chloride 0.9% BOLUS 500 mL (0 mLs Intravenous Stopped 5/6/25 1452)   metoclopramide (REGLAN) injection 10 mg (10 mg Intravenous $Given 5/6/25 1243)   diphenhydrAMINE (BENADRYL) injection 12.5 mg (12.5 mg Intravenous $Given 5/6/25 1243)         NEW PRESCRIPTIONS STARTED AT TODAY'S ED VISIT:  New Prescriptions    No medications on file         HPI     Liborio Finch is a 64 year old male with a pertinent history of STEMI s/p PCI, RA, and unstable angina who presents to the ED for evaluation of chest pain. Pt states starting mid morning on Sunday (3 days ago) he started having mid/left sternal chest pressure.  Pain is not positional or pleuritic.  Pain is not made worse with exertion. He denies any radiation of pain and states this does not feel similar to previous STEMI as he had severe chest and arm pain at that time. Rates chest pain at 3/10. He was initially evaluated at [facility] urgent care and EKG showed mild ST depressions in V3 and V4 so he was sent to the ED. No meds given at urgent care.        Physical Exam     First Vitals:  Patient Vitals for the past 24 hrs:   BP Temp Temp src Pulse Resp SpO2 Height Weight   05/06/25 1645 -- -- -- 59 19 97 % -- --   05/06/25 1632 (!) 164/77 -- -- 60 -- 96 % -- --   05/06/25 1247 (!) 159/81 -- -- 62 -- 96 % -- --   05/06/25 1201 (!) 194/93 98.3  F (36.8  C) Oral 76 17 98 % 1.676 m (5' 6\") 63.5 kg (140 lb)         PHYSICAL EXAM    General Appearance:  Alert, cooperative, no distress, appears stated age  HENT: Normocephalic without obvious deformity, atraumatic. Mucous membranes moist   Eyes: Conjunctiva clear, Lids normal. No discharge.   Respiratory: No distress. Lungs clear to ausculation bilaterally. No wheezes, rhonchi or stridor  Cardiovascular: Regular rate and rhythm, no murmur. Normal cap refill. No peripheral edema  GI: Abdomen soft, nontender  : No CVA " tenderness  Musculoskeletal: Moving all extremities. No gross deformities  Integument: Warm, dry, no rashes or lesions  Neurologic: Alert and orientated x3. No focal deficits.  Psych: Normal mood and affect        Results     LAB:  All pertinent labs reviewed and interpreted  Labs Ordered and Resulted from Time of ED Arrival to Time of ED Departure   COMPREHENSIVE METABOLIC PANEL - Abnormal       Result Value    Sodium 137      Potassium 3.6      Carbon Dioxide (CO2) 20 (*)     Anion Gap 13      Urea Nitrogen 14.7      Creatinine 1.01      GFR Estimate 84      Calcium 8.9      Chloride 104      Glucose 114 (*)     Alkaline Phosphatase 109      AST 21      ALT 10      Protein Total 7.2      Albumin 4.3      Bilirubin Total 0.3     CBC WITH PLATELETS AND DIFFERENTIAL - Abnormal    WBC Count 5.9      RBC Count 3.99 (*)     Hemoglobin 12.2 (*)     Hematocrit 36.4 (*)     MCV 91      MCH 30.6      MCHC 33.5      RDW 15.2 (*)     Platelet Count 149 (*)     % Neutrophils 65      % Lymphocytes 23      % Monocytes 7      % Eosinophils 3      % Basophils 1      % Immature Granulocytes 1      NRBCs per 100 WBC 0      Absolute Neutrophils 3.8      Absolute Lymphocytes 1.4      Absolute Monocytes 0.4      Absolute Eosinophils 0.2      Absolute Basophils 0.1      Absolute Immature Granulocytes 0.0      Absolute NRBCs 0.0     TROPONIN T, HIGH SENSITIVITY - Normal    Troponin T, High Sensitivity 8     MAGNESIUM - Normal    Magnesium 1.9     TSH WITH FREE T4 REFLEX - Normal    TSH 4.08     TROPONIN T, HIGH SENSITIVITY - Normal    Troponin T, High Sensitivity 7         RADIOLOGY:  XR Chest Port 1 View   Final Result   IMPRESSION:       Heart size is normal. Lungs are clear bilaterally. Mediastinum and visualized bony structures are unremarkable.             ECG:  Performed at: 1203    Impression: Sinus rhythm    Rate: 75  Rhythm: Sinus  Axis: normal  GA Interval: 124  QRS Interval: 88  QTc Interval: 462  ST Changes: mild ST  depressions in V3 and V4  Comparison: when compared to previous EKG from urgent care prior to arrival ST depressions in V3 and V4 slightly improved    EKG results reviewed and interpreted by Dr. Cook, ED MD.           PROCEDURES:  N/A      Tanvi Dumont PA-C   Emergency Medicine   [facility] EMERGENCY ROOM       Tanvi Dumont PA-C  05/06/25 5638

## 2025-05-07 ENCOUNTER — APPOINTMENT (OUTPATIENT)
Dept: ULTRASOUND IMAGING | Facility: CLINIC | Age: 65
DRG: 303 | End: 2025-05-07
Payer: COMMERCIAL

## 2025-05-07 ENCOUNTER — APPOINTMENT (OUTPATIENT)
Dept: CARDIOLOGY | Facility: CLINIC | Age: 65
DRG: 303 | End: 2025-05-07
Payer: COMMERCIAL

## 2025-05-07 LAB
ABO + RH BLD: NORMAL
ALBUMIN UR-MCNC: NEGATIVE MG/DL
APPEARANCE UR: CLEAR
ATRIAL RATE - MUSE: 56 BPM
ATRIAL RATE - MUSE: 75 BPM
BILIRUB UR QL STRIP: NEGATIVE
BLD GP AB SCN SERPL QL: NEGATIVE
COLOR UR AUTO: ABNORMAL
DIASTOLIC BLOOD PRESSURE - MUSE: 73 MMHG
DIASTOLIC BLOOD PRESSURE - MUSE: 93 MMHG
GLUCOSE UR STRIP-MCNC: NEGATIVE MG/DL
HGB UR QL STRIP: NEGATIVE
INTERPRETATION ECG - MUSE: NORMAL
INTERPRETATION ECG - MUSE: NORMAL
KETONES UR STRIP-MCNC: NEGATIVE MG/DL
LEUKOCYTE ESTERASE UR QL STRIP: NEGATIVE
MUCOUS THREADS #/AREA URNS LPF: PRESENT /LPF
NITRATE UR QL: NEGATIVE
P AXIS - MUSE: 68 DEGREES
P AXIS - MUSE: 76 DEGREES
PH UR STRIP: 6.5 [PH] (ref 5–7)
PR INTERVAL - MUSE: 124 MS
PR INTERVAL - MUSE: 126 MS
QRS DURATION - MUSE: 88 MS
QRS DURATION - MUSE: 88 MS
QT - MUSE: 414 MS
QT - MUSE: 462 MS
QTC - MUSE: 445 MS
QTC - MUSE: 462 MS
R AXIS - MUSE: 52 DEGREES
R AXIS - MUSE: 62 DEGREES
RBC URINE: <1 /HPF
SP GR UR STRIP: 1.01 (ref 1–1.03)
SPECIMEN EXP DATE BLD: NORMAL
SYSTOLIC BLOOD PRESSURE - MUSE: 157 MMHG
SYSTOLIC BLOOD PRESSURE - MUSE: 194 MMHG
T AXIS - MUSE: 25 DEGREES
T AXIS - MUSE: 40 DEGREES
UFH PPP CHRO-ACNC: 0.24 IU/ML (ref ?–1.1)
UFH PPP CHRO-ACNC: 0.35 IU/ML
UFH PPP CHRO-ACNC: 0.56 IU/ML (ref ?–1.1)
UROBILINOGEN UR STRIP-MCNC: NORMAL MG/DL
VENTRICULAR RATE- MUSE: 56 BPM
VENTRICULAR RATE- MUSE: 75 BPM
WBC URINE: <1 /HPF

## 2025-05-07 PROCEDURE — 81001 URINALYSIS AUTO W/SCOPE: CPT

## 2025-05-07 PROCEDURE — 250N000013 HC RX MED GY IP 250 OP 250 PS 637

## 2025-05-07 PROCEDURE — 250N000013 HC RX MED GY IP 250 OP 250 PS 637: Performed by: STUDENT IN AN ORGANIZED HEALTH CARE EDUCATION/TRAINING PROGRAM

## 2025-05-07 PROCEDURE — 85520 HEPARIN ASSAY: CPT | Performed by: STUDENT IN AN ORGANIZED HEALTH CARE EDUCATION/TRAINING PROGRAM

## 2025-05-07 PROCEDURE — 85520 HEPARIN ASSAY: CPT

## 2025-05-07 PROCEDURE — 99238 HOSP IP/OBS DSCHRG MGMT 30/<: CPT | Mod: GC

## 2025-05-07 PROCEDURE — 36415 COLL VENOUS BLD VENIPUNCTURE: CPT

## 2025-05-07 PROCEDURE — 120N000004 HC R&B MS OVERFLOW

## 2025-05-07 PROCEDURE — 36415 COLL VENOUS BLD VENIPUNCTURE: CPT | Performed by: NURSE PRACTITIONER

## 2025-05-07 PROCEDURE — 36415 COLL VENOUS BLD VENIPUNCTURE: CPT | Performed by: STUDENT IN AN ORGANIZED HEALTH CARE EDUCATION/TRAINING PROGRAM

## 2025-05-07 PROCEDURE — 86901 BLOOD TYPING SEROLOGIC RH(D): CPT | Performed by: NURSE PRACTITIONER

## 2025-05-07 PROCEDURE — 93306 TTE W/DOPPLER COMPLETE: CPT

## 2025-05-07 PROCEDURE — 250N000011 HC RX IP 250 OP 636

## 2025-05-07 PROCEDURE — 93306 TTE W/DOPPLER COMPLETE: CPT | Mod: 26 | Performed by: INTERNAL MEDICINE

## 2025-05-07 PROCEDURE — 99223 1ST HOSP IP/OBS HIGH 75: CPT | Performed by: STUDENT IN AN ORGANIZED HEALTH CARE EDUCATION/TRAINING PROGRAM

## 2025-05-07 PROCEDURE — 93971 EXTREMITY STUDY: CPT | Mod: LT

## 2025-05-07 PROCEDURE — G0463 HOSPITAL OUTPT CLINIC VISIT: HCPCS

## 2025-05-07 RX ORDER — IBUPROFEN 400 MG/1
400 TABLET, FILM COATED ORAL ONCE
Status: COMPLETED | OUTPATIENT
Start: 2025-05-07 | End: 2025-05-07

## 2025-05-07 RX ORDER — ACETAMINOPHEN 325 MG/1
650 TABLET ORAL EVERY 6 HOURS PRN
Status: CANCELLED | OUTPATIENT
Start: 2025-05-07

## 2025-05-07 RX ORDER — LIDOCAINE 40 MG/G
CREAM TOPICAL
Status: CANCELLED | OUTPATIENT
Start: 2025-05-07

## 2025-05-07 RX ORDER — SODIUM CHLORIDE 9 MG/ML
INJECTION, SOLUTION INTRAVENOUS CONTINUOUS
Status: CANCELLED | OUTPATIENT
Start: 2025-05-07

## 2025-05-07 RX ORDER — ASPIRIN 81 MG/1
243 TABLET, CHEWABLE ORAL ONCE
Status: CANCELLED | OUTPATIENT
Start: 2025-05-07

## 2025-05-07 RX ORDER — ACETAMINOPHEN 325 MG/1
650 TABLET ORAL EVERY 6 HOURS PRN
Status: DISCONTINUED | OUTPATIENT
Start: 2025-05-07 | End: 2025-05-08 | Stop reason: HOSPADM

## 2025-05-07 RX ORDER — ATORVASTATIN CALCIUM 40 MG/1
80 TABLET, FILM COATED ORAL EVERY EVENING
Status: CANCELLED | OUTPATIENT
Start: 2025-05-07

## 2025-05-07 RX ORDER — IBUPROFEN 200 MG
200 TABLET ORAL ONCE
Status: DISCONTINUED | OUTPATIENT
Start: 2025-05-07 | End: 2025-05-07

## 2025-05-07 RX ORDER — DIAZEPAM 5 MG/1
10 TABLET ORAL
Status: DISCONTINUED | OUTPATIENT
Start: 2025-05-07 | End: 2025-05-08 | Stop reason: HOSPADM

## 2025-05-07 RX ORDER — LOSARTAN POTASSIUM 25 MG/1
25 TABLET ORAL DAILY
Status: CANCELLED | OUTPATIENT
Start: 2025-05-07

## 2025-05-07 RX ORDER — DIAZEPAM 5 MG/1
10 TABLET ORAL
Status: CANCELLED | OUTPATIENT
Start: 2025-05-07

## 2025-05-07 RX ORDER — SULFASALAZINE 500 MG/1
1500 TABLET ORAL 2 TIMES DAILY
Status: CANCELLED | OUTPATIENT
Start: 2025-05-07

## 2025-05-07 RX ORDER — CLOPIDOGREL BISULFATE 75 MG/1
75 TABLET ORAL DAILY
Status: DISCONTINUED | OUTPATIENT
Start: 2025-05-07 | End: 2025-05-08 | Stop reason: HOSPADM

## 2025-05-07 RX ORDER — PANTOPRAZOLE SODIUM 40 MG/1
40 TABLET, DELAYED RELEASE ORAL
Status: DISCONTINUED | OUTPATIENT
Start: 2025-05-07 | End: 2025-05-08 | Stop reason: HOSPADM

## 2025-05-07 RX ORDER — HEPARIN SODIUM 10000 [USP'U]/100ML
0-5000 INJECTION, SOLUTION INTRAVENOUS CONTINUOUS
Status: CANCELLED | OUTPATIENT
Start: 2025-05-07

## 2025-05-07 RX ORDER — ASPIRIN 325 MG
325 TABLET ORAL ONCE
Status: CANCELLED | OUTPATIENT
Start: 2025-05-07 | End: 2025-05-07

## 2025-05-07 RX ORDER — FENTANYL CITRATE 50 UG/ML
25 INJECTION, SOLUTION INTRAMUSCULAR; INTRAVENOUS
Refills: 0 | Status: CANCELLED | OUTPATIENT
Start: 2025-05-07

## 2025-05-07 RX ORDER — CLOPIDOGREL BISULFATE 75 MG/1
75 TABLET ORAL DAILY
Status: CANCELLED | OUTPATIENT
Start: 2025-05-08

## 2025-05-07 RX ORDER — ASPIRIN 81 MG/1
81 TABLET, CHEWABLE ORAL DAILY
Status: CANCELLED | OUTPATIENT
Start: 2025-05-08

## 2025-05-07 RX ORDER — ASPIRIN 81 MG/1
81 TABLET, CHEWABLE ORAL DAILY
Status: DISCONTINUED | OUTPATIENT
Start: 2025-05-07 | End: 2025-05-08 | Stop reason: HOSPADM

## 2025-05-07 RX ORDER — ATORVASTATIN CALCIUM 40 MG/1
80 TABLET, FILM COATED ORAL EVERY EVENING
Status: DISCONTINUED | OUTPATIENT
Start: 2025-05-07 | End: 2025-05-08 | Stop reason: HOSPADM

## 2025-05-07 RX ADMIN — CLOPIDOGREL BISULFATE 75 MG: 75 TABLET ORAL at 12:15

## 2025-05-07 RX ADMIN — SULFASALAZINE 1500 MG: 500 TABLET ORAL at 08:32

## 2025-05-07 RX ADMIN — ATORVASTATIN CALCIUM 80 MG: 40 TABLET, FILM COATED ORAL at 20:33

## 2025-05-07 RX ADMIN — HEPARIN SODIUM 500 UNITS/HR: 10000 INJECTION, SOLUTION INTRAVENOUS at 19:41

## 2025-05-07 RX ADMIN — LOSARTAN POTASSIUM 25 MG: 25 TABLET, FILM COATED ORAL at 08:32

## 2025-05-07 RX ADMIN — ACETAMINOPHEN 650 MG: 325 TABLET, FILM COATED ORAL at 08:55

## 2025-05-07 RX ADMIN — SULFASALAZINE 1500 MG: 500 TABLET ORAL at 20:33

## 2025-05-07 RX ADMIN — IBUPROFEN 400 MG: 400 TABLET ORAL at 14:58

## 2025-05-07 RX ADMIN — PANTOPRAZOLE SODIUM 40 MG: 40 TABLET, DELAYED RELEASE ORAL at 13:42

## 2025-05-07 RX ADMIN — ASPIRIN 81 MG CHEWABLE TABLET 81 MG: 81 TABLET CHEWABLE at 12:15

## 2025-05-07 ASSESSMENT — ACTIVITIES OF DAILY LIVING (ADL)
ADLS_ACUITY_SCORE: 42
ADLS_ACUITY_SCORE: 41
ADLS_ACUITY_SCORE: 42
ADLS_ACUITY_SCORE: 41
ADLS_ACUITY_SCORE: 41
ADLS_ACUITY_SCORE: 42
ADLS_ACUITY_SCORE: 42
ADLS_ACUITY_SCORE: 41
ADLS_ACUITY_SCORE: 42
ADLS_ACUITY_SCORE: 42
ADLS_ACUITY_SCORE: 41

## 2025-05-07 NOTE — CONSULTS
"[facility]  WOC Nurse Inpatient Assessment     Consulted for: colostomy LLQ    Summary: Patient has had ostomy for 15 years, completely independent.  Denies any issues.  Changes every 3-5 days.  WOC will sign off    WOC nurse follow-up plan: signing off    Patient History (according to provider note(s):      Liborio Finch is a 64 year old male who has a history of CAD, STEMI 2022 s/p PCI LAD, ischemic cardiomyopathy LVEF greater than 60% 5/2023, HTN, HLD, PAD s/p left femoral endarterectomy and iliac stent 10/2022, RA, colon cancer s/p colostomy in remission, former tobacco use, and is admitted for unstable angina.     Assessment:      Assessment of Established end Colostomy:  How long has patient had ostomy: 15 yrs  Stoma: not seen, pouch in place with cover  Mucutaneous junction: not visualized (barrier in place)  Peristomal skin: none patient denies  Output: gas and soft    Is patient independent with ostomy care?: Yes  Home pouching system: [place] 8550 with entrust paste  Pouching issues and/or educational needs:not needed  Interventions completed today: not needed  Pouching system in use while hospitalized:  Ashutosh one piece, cut to fit, flat, and barrier ring  Supplies location: ordered supplies         Treatment Plan:     LLQ Colostomy pouching plan:   Pouching system: ostomy supplies pouches: Ash[place]osh Flat FECAL ([id number])   Accessories used: [facility] ostomy accessories: 2\" Cera Barrier Ring ([id number])   Frequency of pouch changes: Twice weekly and PRN leakage  WOC follow up plan: Notify WOC if leakage occurs  Bedside RN interventions: Patient independent with cares      Orders: Written    RECOMMEND PRIMARY TEAM ORDER: None, at this time  Education provided: plan of care  Discussed plan of care with: Patient and Family  Notify WOC if wound(s) deteriorate.  Nursing to notify the Provider(s) and re-consult the WOC Nurse if new skin concern.    DATA:     Current support surface: " Standard  Standard gel mattress (Isoflex)  Containment of urine/stool: Continent of bladder and Colostomy pouch  BMI: Body mass index is 23.48 kg/m .   Active diet order: Orders Placed This Encounter      Combination Diet Low Saturated Fat Na <2400mg Diet     Output: I/O last 3 completed shifts:  In: 515.51 [P.O.:450; I.V.:65.51]  Out: 600 [Urine:600]     Labs:   Recent Labs   Lab 05/06/25 2125 05/06/25  1236   ALBUMIN  --  4.3   HGB 12.5* 12.2*   WBC 6.8 5.9     Pressure injury risk assessment:   Sensory Perception: 4-->no impairment  Moisture: 4-->rarely moist  Activity: 3-->walks occasionally  Mobility: 4-->no limitation  Nutrition: 3-->adequate  Friction and Shear: 3-->no apparent problem  Parker Score: 21    NAY CruzN RN CWOCN  Pager no longer in use, please contact through [employer] group: [facility]

## 2025-05-07 NOTE — PROGRESS NOTES
[facility]    Progress Note - Hospitalist Service       Date of Admission:  5/6/2025    Assessment & Plan   Liborio Finch is a 64 year old male admitted on 5/6/2025. He has a history of CAD, STEMI 2022 s/p PCI LAD, ischemic cardiomyopathy LVEF greater than 60% 5/2023, HTN, HLD, PAD s/p left femoral endarterectomy and iliac stent 10/2022, RA, colon cancer s/p colostomy in remission, former tobacco use, and is admitted for unstable angina     Unstable angina  CAD  STEMI 2022 s/p PCI LAD  Ischemic cardiomyopathy LVEF >60% 5/2023  HTN  HLD  Presented to urgent care with 3 days of chest pain, EKG showed ST depression V3 V4 however this was not appreciated on EKG performed in the ED.  ED provider spoke with cardiologist Dr. Beard who agreed there is a risk that symptoms are consistent with unstable angina would recommend coronary angiography at [facility].  No beds available, will admit to [facility] and await bed availability.  Should chest pain return, he recommended starting heparin GTT.   Of note, patient stopped taking his antihypertensives a few months ago which included losartan 25 mg metoprolol succinate 25 mg.  Lipid panel 1/2025 with , ASCVD 10yr risk w/o HTN treatment 18.3%. Would benefit from high intensity statin. Heart Score of 4.  - Cardiology consulted, appreciate expertise  - Plan to transfer to [facility] for coronary angio when bed is available  - Continue PTA Plavix, ASA  - Echocardiogram  - UA for evidence of EOD  - NTG sublingual as needed  - Heparin GTT  - Cardiac tele  - n.p.o. midnight  - Restart losartan 25mg        LLE pain  PAD s/p left femoral endarterectomy and iliac stent  - US duplex LLE, eval for DVT        Chronic conditions:  RA - Continue PTA sulfasalazine  Colon cancer s/p colostomy in remission - WOC consult         Diet: NPO for Medical/Clinical Reasons Except for: Meds    DVT Prophylaxis: Heparin ggt  Garibay Catheter: Not present  Fluids:  PO  Lines: None     Cardiac Monitoring: None  Code Status: No CPR- Do NOT Intubate      Clinically Significant Risk Factors Present on Admission   { TIP  This section helps capture the illness of the patient on admission.     - Review diagnoses highlighted in blue; right click, edit & delete if not appropriate   - If blank, no additional diagnoses identified   :39046}              # Drug Induced Platelet Defect: home medication list includes an antiplatelet medication                        Social Drivers of Health   Tobacco Use: High Risk (5/6/2025)    Patient History     Smoking Tobacco Use: Every Day     Smokeless Tobacco Use: Never    Received from [facility]    Financial Resource Strain    Received from [facility]    Social Connections         Disposition Plan   {TIP  It is advised to update the Medical Readiness for Discharge [MRD] daily, until the patient is 'Ready Now.' Last Documentation- Anticipated Tomorrow 05/07/2025. Use the SmartList below to update for today:275276}  Medically Ready for Discharge: {TIME; MEDICALLY READY FOR DISCHARGE:48639682}         The patient's care was discussed with the { :449052}.    Zuleika Escobar MD  Hospitalist Service  [facility]  Securely message with [employer] (more info)  Text page via [employer] Paging/Directory   ______________________________________________________________________    Interval History   {Pertinent overnight events :***}    Physical Exam   Vital Signs: Temp: 97.7  F (36.5  C) Temp src: Oral BP: (!) 142/76 Pulse: 67   Resp: 18 SpO2: 96 % O2 Device: None (Room air)    Weight: 145 lbs 8.06 oz    {Recommend personal SmartPhrase or Notewriter for exam (OPTIONAL)   :459166}    Medical Decision Making   { TIP   MDM Calculator    MDM grid (w/ times)    Coding Support Chat  Billing is now based on time OR medical decision making complexity. Medical decision making  included in the A&P does NOT need to be re-documented. Documented time is for the billing provider only (not including resident/fellow time).    :86341}    {Medical Decision Making (OPTIONAL)   :300903}      Data   {INSERT LABS/IMAGING (OPTIONAL)   :150420}

## 2025-05-07 NOTE — DISCHARGE SUMMARY
[facility]  Hospitalist Discharge Summary      Date of Admission:  5/6/2025  Date of Discharge:  5/7/2025  Discharging Provider: Zuleika Escobar MD, attending Dr. Yarbrough  Discharge Service: Hospitalist Service    Discharge Diagnoses   Chest pain  CAD    Follow-ups Needed After Discharge   Pending recs from [facility] Discharge    Discharge Disposition   Transferred to [facility]  Condition at discharge: Stable    Hospital Course      Liborio Finch is a 64 year old male who has a history of CAD, STEMI 2022 s/p PCI LAD, ischemic cardiomyopathy LVEF greater than 60% 5/2023, HTN, HLD, PAD s/p left femoral endarterectomy and iliac stent 10/2022, RA, colon cancer s/p colostomy in remission, former tobacco use, and is admitted for chest pain with possible unstable angina.    Chest pain  Concern for unstable angina  CAD  STEMI 2022 s/p PCI LAD  Ischemic cardiomyopathy LVEF >60% 5/2023  HTN  HLD  Presented to urgent care with 3 days of chest pain, with EKG showing ST depression in leads V3 and V4 however this was not appreciated on EKG performed in the ED.  Per Cardiology consult in the ED, possibility of unstable angina with recommendation for coronary angio at [facility]. Admitted to [facility] given no bed availability. Per cards recommendation from the ED, started on heparin drip as patient continued to have chest pain s/p nitroglycerin. Echo overall normal with no significant valvular disease or change in systolic function, LVEF ~65%. Patient stopped taking many of his medications a few months ago (including due to concern of side effects including short temper and sudden anger. Lipid panel 1/2025 with , ASCVD 10yr risk w/o HTN treatment 18.3%. Heart Score of 4.  - Cardiology consulted, appreciate expertise:   - Transfer to [facility] for coronary angiogram 5/7   - Continue ASA, Plavix   - Start atorvostatin 80mg ( on discussion with patient, he will consider re-starting statin  medication)   - Consider carvedilol due to possible past intolerance to metoprolol  - Continue heparin until prior to angiogram  - Continue losartan 25mg (restarted on admission)    LLE pain  PAD s/p left femoral endarterectomy and iliac stent  LLE Duplex negative    Chronic headache  Patient reports multiple months of frontal headache, 5/10 with minimal response to tylenol and Advil. Headache is present daily especially in the morning and can resolve thoughout the day. Occasionally wakes from sleep. No vision changes.   - Low concern for acute finding given chronic course. However, given concerning symptom of not alleviating by medication and waking from sleep, consider further workup inpatient vs outpatient.    Chronic conditions:  RA - Continue PTA sulfasalazine  Colon cancer s/p colostomy (has had for 15 years), in remission     Consultations This Hospital Stay   PHARMACY IP CONSULT  WOUND OSTOMY CONTINENCE NURSE  IP CONSULT  CARDIOLOGY IP CONSULT  CARDIOLOGY IP CONSULT    Code Status   No CPR- Do NOT Intubate    The patient's care was discussed with the Attending Physician, Dr. Yarbrough.    Zuleika Escobar MD  [facility]  [address] [place] [id number]  Phone: [phone]  Fax: [phone]  ______________________________________________________________________    Physical Exam   Vital Signs: Temp: 97.7  F (36.5  C) Temp src: Oral BP: (!) 152/88 Pulse: 68   Resp: 18 SpO2: 95 % O2 Device: None (Room air)    Weight: 145 lbs 8.06 oz    Constitutional: Awake, alert, cooperative, no apparent distress.  Eyes: Lids and lashes normal, extra ocular muscles intact, sclera clear, conjunctiva normal.  ENT: Normocephalic, atraumatic. Moist mucous membranes.  Respiratory: No increased work of breathing, no accessory muscle use, clear to auscultation bilaterally, no crackles or wheezing.  Cardiovascular: Regular rate and rhythm, normal S1 and S2, no S3 or S4, and no murmur  noted  GI: Abdomen soft, non-tender, non-distended, no masses palpated.  Skin: No bruising or bleeding and normal skin color, texture, turgor.  Musculoskeletal: LLE tenderness to palpation, no edema or erythema.   Neurologic: Awake, alert, oriented to name, place and time. Sensation intact bilaterally.          Primary Care Physician   Gen Melendez    Discharge Orders   No discharge procedures on file.    Significant Results and Procedures   Most Recent 3 CBC's:  Recent Labs   Lab Test 05/06/25 2125 05/06/25  1236   WBC 6.8 5.9   HGB 12.5* 12.2*   MCV 91 91    149*     Most Recent 3 BMP's:  Recent Labs   Lab Test 05/06/25  1236 07/09/19  1341    135*   POTASSIUM 3.6 4.4   CHLORIDE 104 102   CO2 20* 16*   BUN 14.7 16   CR 1.01 0.86   ANIONGAP 13 17   ARGELIA 8.9 9.8   * 82   ,   Results for orders placed or performed during the hospital encounter of 05/06/25   XR Chest Port 1 View    Narrative    EXAM: XR CHEST PORT 1 VIEW  LOCATION: [facility]  DATE: 5/6/2025    INDICATION: chest pain  COMPARISON: 5/5/2023      Impression    IMPRESSION:     Heart size is normal. Lungs are clear bilaterally. Mediastinum and visualized bony structures are unremarkable.    US Lower Extremity Venous Duplex Left    Narrative    EXAM: US LOWER EXTREMITY VENOUS DUPLEX LEFT  LOCATION: [facility]  DATE: 5/7/2025    INDICATION: Left lower extremity pain.  COMPARISON: None.  TECHNIQUE: Venous Duplex ultrasound of the left lower extremity with and without compression, augmentation and duplex. Color flow and spectral Doppler with waveform analysis performed.    FINDINGS: Exam includes the common femoral, femoral, popliteal, and contralateral common femoral veins as well as segmentally visualized deep calf veins and greater saphenous vein.     LEFT: No deep vein thrombosis. No superficial thrombophlebitis. No popliteal cyst.      Impression    IMPRESSION:  1.  No deep venous  thrombosis in the left lower extremity.   Echocardiogram Complete    Narrative    [id number]  [id number]  [id number]  [id number]^[place] [address]     Name: QING BEAL  MRN: [id number]  : 1960  Study Date: 2025 06:55 AM  Age: 64 yrs  Gender: Male  Patient Location: [facility]  Ordering Physician: MAI BUI  Performed By:      BSA: 1.7 m2  Height: 66 in  Weight: 145 lb  HR: 77  BP: 176/81 mmHg  ______________________________________________________________________________  Procedure  Echocardiogram with two-dimensional, color and spectral Doppler.  ______________________________________________________________________________  Interpretation Summary     1. Normal left ventricular size and systolic performance. The visually  estimated ejection fraction is 65%.  2. No significant valvular heart disease is identified on this study.  3. Normal right ventricular size and systolic performance.  ______________________________________________________________________________  Left ventricle:  Normal left ventricular size and systolic performance. The visually estimated  ejection fraction is 65%. There is normal regional wall motion. Left  ventricular wall thickness is normal.     Assessment of LV Diastolic Function: The cumulative findings suggest normal  diastolic filling [The septal e' velocity is > 7 cm/s & lateral e' velocity  is  > 10 cm/s. The average E/e' is < 14. TR velocity is < 2.8 m/s. Left atrial  volume index is less than 34 mL/mÂ ].     Right ventricle:  Normal right ventricular size and systolic performance.     Left atrium:  The left atrium is of normal size.     Right atrium:  The right atrium is of normal size.     IVC:  The IVC is of normal caliber.     Aortic valve:  The aortic valve is comprised of three cusps. No significant aortic stenosis  or aortic insufficiency is detected on this study.     Mitral valve:  The mitral valve  appears morphologically normal.There is mild mitral  insufficiency.     Tricuspid valve:  The tricuspid valve is grossly morphologically normal. There is trace  tricuspid insufficiency.     Pulmonic valve:  The pulmonic valve is grossly morphologically normal. There is mild pulmonic  insufficiency.     Thoracic aorta:  The aortic root and proximal ascending aorta are of normal dimension.     Pericardium:  There is no significant pericardial effusion.  ______________________________________________________________________________  ______________________________________________________________________________  MMode/2D Measurements & Calculations  IVSd: 0.80 cm  LVIDd: 5.0 cm  LVIDs: 2.9 cm  LVPWd: 0.95 cm  FS: 41.2 %  LV mass(C)d: 152.6 grams  LV mass(C)dI: 87.5 grams/m2  Ao root diam: 3.1 cm  asc Aorta Diam: 2.7 cm  LVOT diam: 1.9 cm  LVOT area: 2.8 cm2  Ao root diam index Ht(cm/m): 1.8  Ao root diam index BSA (cm/m2): 1.8  Asc Ao diam index BSA (cm/m2): 1.5  Asc Ao diam index Ht(cm/m): 1.6  EF Biplane: 72.7 %     LA Volume Indexed (AL/bp): 22.6 ml/m2  RV Base: 3.3 cm  RWT: 0.38  TAPSE: 2.3 cm     Doppler Measurements & Calculations  MV E max rakan: 63.8 cm/sec  MV A max rakan: 62.1 cm/sec  MV E/A: 1.0  MV max P.4 mmHg  MV mean P.0 mmHg  MV V2 VTI: 22.8 cm  MVA(VTI): 2.2 cm2  MV dec slope: 209.0 cm/sec2  MV dec time: 0.31 sec  Ao V2 max: 135.0 cm/sec  Ao max P.0 mmHg  Ao V2 mean: 80.1 cm/sec  Ao mean PG: 3.0 mmHg  Ao V2 VTI: 24.8 cm  JOSSELIN(I,D): 2.0 cm2  JOSSELIN(V,D): 1.9 cm2  LV V1 max PG: 3.2 mmHg  LV V1 max: 88.8 cm/sec  LV V1 VTI: 17.4 cm     SV(LVOT): 49.3 ml  SI(LVOT): 28.3 ml/m2  PA V2 max: 82.8 cm/sec  PA max P.7 mmHg  PA acc time: 0.13 sec  PI end-d rakan: 117.0 cm/sec  TR max rakan: 214.0 cm/sec  TR max P.3 mmHg  AV Rakan Ratio (DI): 0.66  JOSSELIN Index (cm2/m2): 1.1  E/E' av.1  Lateral E/e': 6.0  Medial E/e': 8.3  RV S Rakan: 12.4 cm/sec      ______________________________________________________________________________  Report approved by: Scout Chu MD on 05/07/2025 09:21 AM             Discharge Medications     Current Facility-Administered Medications:     acetaminophen (TYLENOL) tablet 650 mg, 650 mg, Oral, Q6H PRN, Zuleika Escobar MD, 650 mg at 05/07/25 0855    aspirin (ASA) chewable tablet 81 mg, 81 mg, Oral, Daily, Chirag, Man, DO, 81 mg at 05/07/25 1215    atorvastatin (LIPITOR) tablet 80 mg, 80 mg, Oral, QPM, Chirag, Man, DO    clopidogrel (PLAVIX) tablet 75 mg, 75 mg, Oral, Daily, ChiragMan vallejo, DO, 75 mg at 05/07/25 1215    diazepam (VALIUM) tablet 10 mg, 10 mg, Oral, Once PRN, Gale Ortiz, CNP    heparin 25,000 units in 0.45% NaCl 250 mL ANTICOAGULANT infusion, 0-5,000 Units/hr, Intravenous, Continuous, Tod Landon DO, Last Rate: 9 mL/hr at 05/07/25 1200, 900 Units/hr at 05/07/25 1200    losartan (COZAAR) tablet 25 mg, 25 mg, Oral, Daily, Tod Landon DO, 25 mg at 05/07/25 0832    pantoprazole (PROTONIX) EC tablet 40 mg, 40 mg, Oral, QAM AC, Tod Landon DO, 40 mg at 05/07/25 1342    sulfaSALAzine (AZULFIDINE) tablet 1,500 mg, 1,500 mg, Oral, BID, Fish Luke MD, 1,500 mg at 05/07/25 0832    Allergies   No Known Allergies

## 2025-05-07 NOTE — H&P (VIEW-ONLY)
"[facility]   [address], [place] [address]   [email or web address]   OFFICE: [phone]     CARDIOLOGY INPATIENT CONSULT NOTE     Impression and Plan     Assessment/Plan:  Mr. Liborio Finch is a 64 year old male with CAD s/p NSTEMI with PCI to LAD 2022, moderate stenosis of the RCA, HTN, HLD, PAD, RA, colon cancer s/p colostomy, former smoker, who presented to the hospital with chest pain.     Chest pain   - Concern for unstable angina given similar symptoms to previous, known moderate stenosis of RCA in 2023   - Discussed options of noninvasive vs invasive testing. Pt prefers to proceed with invasive angiography.  Transfer to [place] today for this    CAD   - .  Goal LDL <70.  Start atorvastatin 80mg.  Apparently had intolerance to rosuvastatin   - Cont ASA and plavix for now    - Consider starting carvedilol.  Possibly had intolerance to metoprolol    Will arrange transfer to [place] for angiography    Primary Cardiologist: Ayana Hernandez PA-C  at [facility]     History of Present Illness      Mr. Liborio Finch is a 64 year old male with CAD s/p NSTEMI with PCI to LAD 2022, moderate stenosis of the RCA, HTN, HLD, PAD, RA, colon cancer s/p colostomy, former smoker, who presented to the hospital with chest pain.  The patient notes last week he [place] and the day afterwards started having a throbbing chest pain.  This has waxed/waned.  Does get slightly worse with exertion.  He denies any associated symptoms.  He notes symptoms are similar to his previous pain with MI, however did have arm pain which he does not today.  He notes stopping his statin and metoprolol on his own as it \"made him mean.\"  He continued to take his aspirin and plavix.            Review of Systems:  Further review of systems is otherwise negative/noncontributory (based on review of medical record (admission H&P) and 13 point review of " systems reviewed. Pertinent positives noted).    Cardiac Diagnostics     ECG: Personally reviewed and interpreted: 5/6/2025  SB    Telemetry (personally reviewed): NSR    Most recent:  Echocardiogram (results reviewed): 5/7/2025  Interpretation Summary     1. Normal left ventricular size and systolic performance. The visually  estimated ejection fraction is 65%.  2. No significant valvular heart disease is identified on this study.  3. Normal right ventricular size and systolic performance.    Cardiac Cath (results reviewed): 5/5/2023  Diagnostic Findings     * Coronary angiography shows right dominance.     * LM: minimal luminal irregularities.     * LAD: patent prior stent 10% ISR.     * LCx: 20% stenosis.     * RCA: mid 40% stenosis.     Medical History  Surgical History Family History Social History   No past medical history on file.  Past Surgical History:   Procedure Laterality Date     APPENDECTOMY       COLON SURGERY       IR LOWER EXTREMITY ANGIOGRAM LEFT  10/13/2022     Family History   Problem Relation Age of Onset     Asthma Mother      Asthma Sister      Cerebrovascular Disease Maternal Grandfather            Social History     Socioeconomic History     Marital status:      Spouse name: Not on file     Number of children: Not on file     Years of education: Not on file     Highest education level: Not on file   Occupational History     Not on file   Tobacco Use     Smoking status: Every Day     Current packs/day: 0.50     Types: Cigarettes     Smokeless tobacco: Never   Substance and Sexual Activity     Alcohol use: Yes     Alcohol/week: 24.0 standard drinks of alcohol     Drug use: No     Sexual activity: Not on file     Comment:    Other Topics Concern     Not on file   Social History Narrative     Not on file     Social Drivers of Health     Financial Resource Strain: High Risk (1/1/2022)    Received from [employer] [facility]    Financial Resource Strain       "Difficulty of Paying Living Expenses: Not on file      Difficulty of Paying Living Expenses: Not on file   Food Insecurity: Not on file   Transportation Needs: Not on file   Physical Activity: Not on file   Stress: Not on file   Social Connections: Unknown (1/1/2022)    Received from [facility] [employer] [facility]    Social Connections      Frequency of Communication with Friends and Family: Not on file   Interpersonal Safety: Not on file   Housing Stability: Not on file             Physical Examination   VITALS: BP (!) 142/76 (BP Location: Right arm)   Pulse 67   Temp 97.7  F (36.5  C) (Oral)   Resp 18   Ht 1.676 m (5' 6\")   Wt 66 kg (145 lb 8.1 oz)   SpO2 96%   BMI 23.48 kg/m    BMI: Body mass index is 23.48 kg/m .  Wt Readings from Last 3 Encounters:   05/07/25 66 kg (145 lb 8.1 oz)   04/04/19 64.2 kg (141 lb 8 oz)       Intake/Output Summary (Last 24 hours) at 5/7/2025 1111  Last data filed at 5/7/2025 0500  Gross per 24 hour   Intake 515.51 ml   Output 600 ml   Net -84.49 ml       General: pleasant male. No acute distress.   HENT: external ears normal. Nares patent. Mucous membranes moist.  Neck: No JVD  Lungs: clear to auscultation  COR:  regular rate and rhythm, No murmurs, rubs, or gallops  Abd: nondistended, BS present  Extrem: No edema            Lab Results Reviewed    Chemistry/lipid CBC Cardiac Enzymes/BNP/TSH/INR   No results for input(s): \"CHOL\", \"HDL\", \"LDL\", \"TRIG\", \"CHOLHDLRATIO\" in the last 35607 hours.  No results for input(s): \"LDL\" in the last 59533 hours.  Recent Labs   Lab Test 05/06/25  1236      POTASSIUM 3.6   CHLORIDE 104   CO2 20*   *   BUN 14.7   CR 1.01   GFRESTIMATED 84   ARGELIA 8.9     Recent Labs   Lab Test 05/06/25  1236 07/09/19  1341   CR 1.01 0.86     No results for input(s): \"A1C\" in the last 80396 hours.       Recent Labs   Lab Test 05/06/25 2125   WBC 6.8   HGB 12.5*   HCT 36.5*   MCV 91        Recent Labs   Lab Test 05/06/25 2125 " "05/06/25  1236   HGB 12.5* 12.2*    No results for input(s): \"TROPONINI\" in the last 58735 hours.  No results for input(s): \"BNP\", \"NTBNPI\", \"NTBNP\" in the last 47142 hours.  Recent Labs   Lab Test 05/06/25  1236   TSH 4.08     No results for input(s): \"INR\" in the last 51252 hours.        Current Inpatient Scheduled Medications   Scheduled Meds:  Current Facility-Administered Medications   Medication Dose Route Frequency Provider Last Rate Last Admin     losartan (COZAAR) tablet 25 mg  25 mg Oral Daily Tod Landon DO   25 mg at 05/07/25 0832     sulfaSALAzine (AZULFIDINE) tablet 1,500 mg  1,500 mg Oral BID Fish Luke MD   1,500 mg at 05/07/25 0832     Continuous Infusions:  Current Facility-Administered Medications   Medication Dose Route Frequency Provider Last Rate Last Admin     heparin 25,000 units in 0.45% NaCl 250 mL ANTICOAGULANT infusion  0-5,000 Units/hr Intravenous Continuous Tod Landon DO 9 mL/hr at 05/07/25 1026 900 Units/hr at 05/07/25 1026       No current outpatient medications on file.          Medications Prior to Admission   Prior to Admission medications    Medication Sig Start Date End Date Taking? Authorizing Provider   aspirin 81 MG EC tablet Take 81 mg by mouth daily.   Yes Unknown, Entered By History   clopidogrel (PLAVIX) 75 MG tablet Take 75 mg by mouth daily.   Yes Unknown, Entered By History   magnesium oxide (MAG-OX) 400 MG tablet Take 400 mg by mouth daily.   Yes Unknown, Entered By History   omeprazole (PRILOSEC) 20 MG capsule [OMEPRAZOLE (PRILOSEC) 20 MG CAPSULE] Take 20 mg by mouth daily before breakfast. 4/4/19  Yes Provider, Historical   sulfaSALAzine (AZULFIDINE) 500 MG tablet Take 1,500 mg by mouth 2 times daily.   Yes Unknown, Entered By History          Man Beard DO [facility]  Non-invasive Cardiologist  [facility]      Clinically Significant Risk Factors Present on Admission                 # Drug Induced Platelet Defect: home medication list " includes an antiplatelet medication

## 2025-05-07 NOTE — PLAN OF CARE
2035H - 0700H  Problem: Adult Inpatient Plan of Care  Goal: Optimal Comfort and Wellbeing  Outcome: Progressing  Problem: Pain Acute  Goal: Optimal Pain Control and Function  Outcome: Progressing    Goal Outcome Evaluation:  Received from ED per Maia álvarez, ongoing heparin infusion at 750units/hr thru his PL left AC, on RA. On tele - NSR, no complaints of any pain upon initial assessment. On 2gms Na+ diet, with colostomy on his left side of his abdomen, voiding freely. Ambulatory. NPO post midnight except meds started & maintained, pt reinstructed. To be transferred to [facility] once bed available for Coronary Angiography. No compliant of chest or any kind of pain, nor SOB, afebrile the whole shift. Endorsed still ongoing heparin infusion..

## 2025-05-07 NOTE — PRE-PROCEDURE
GENERAL PRE-PROCEDURE:   Procedure:  Coronary angiogram with possible PCI, left heart catheterization  Date/Time:  5/8/2025 7:02 AM    Written consent obtained?: Yes    Risks and benefits: Risks, benefits and alternatives were discussed    Consent given by:  Patient  Patient states understanding of procedure being performed: Yes    Patient's understanding of procedure matches consent: Yes    Procedure consent matches procedure scheduled: Yes    Expected level of sedation:  Moderate  Appropriately NPO:  Yes  ASA Class:  3 (CAD with hx of NSTEMI s/p PCI to LAD 2022, moderate stenosis of the RCA, HTN, HLD, PAD, RA, colon cancer s/p colostomy, former tobacco use)  Mallampati  :  Grade 2- soft palate, base of uvula, tonsillar pillars, and portion of posterior pharyngeal wall visible  Lungs:  Lungs clear with good breath sounds bilaterally  Heart:  Normal heart sounds and rate  History & Physical reviewed:  History and physical reviewed and updates made (see comment)  H&P Comments:  Clinically Significant Risk Factors Present on Admission    Cardiovascular : CAD with hx of NSTEMI; s/p PCI to LAD 2022, moderate stenosis of the RCA, HTN, HLD, PAD, former tobacco use    Fluid & Electrolyte Disorders : Not present on admission    Gastroenterology : Not present on admission    Hematology/Oncology : colon cancer s/p colostomy    Nephrology : Not present on admission    Neurology : Not present on admission    Pulmonology : former tobacco use    Systemic : RA    Statement of review:  I have reviewed the lab findings, diagnostic data, medications, and the plan for sedation

## 2025-05-08 ENCOUNTER — HOSPITAL ENCOUNTER (INPATIENT)
Facility: HOSPITAL | Age: 65
LOS: 1 days | Discharge: HOME | End: 2025-05-08
Attending: INTERNAL MEDICINE | Admitting: INTERNAL MEDICINE
Payer: COMMERCIAL

## 2025-05-08 VITALS
BODY MASS INDEX: 22.88 KG/M2 | HEART RATE: 95 BPM | OXYGEN SATURATION: 98 % | HEIGHT: 66 IN | RESPIRATION RATE: 21 BRPM | DIASTOLIC BLOOD PRESSURE: 70 MMHG | SYSTOLIC BLOOD PRESSURE: 126 MMHG | TEMPERATURE: 97.6 F | WEIGHT: 142.4 LBS

## 2025-05-08 VITALS
HEART RATE: 66 BPM | HEIGHT: 66 IN | RESPIRATION RATE: 18 BRPM | BODY MASS INDEX: 22.88 KG/M2 | TEMPERATURE: 98 F | OXYGEN SATURATION: 97 % | WEIGHT: 142.4 LBS | SYSTOLIC BLOOD PRESSURE: 169 MMHG | DIASTOLIC BLOOD PRESSURE: 81 MMHG

## 2025-05-08 DIAGNOSIS — I25.2 HISTORY OF ST ELEVATION MYOCARDIAL INFARCTION (STEMI): ICD-10-CM

## 2025-05-08 DIAGNOSIS — I10 BENIGN ESSENTIAL HYPERTENSION: ICD-10-CM

## 2025-05-08 DIAGNOSIS — Z95.5 STENTED CORONARY ARTERY: ICD-10-CM

## 2025-05-08 DIAGNOSIS — I20.0 UNSTABLE ANGINA (H): Primary | ICD-10-CM

## 2025-05-08 DIAGNOSIS — I20.0 UNSTABLE ANGINA PECTORIS (H): ICD-10-CM

## 2025-05-08 LAB — UFH PPP CHRO-ACNC: 0.11 IU/ML (ref ?–1.1)

## 2025-05-08 PROCEDURE — 99152 MOD SED SAME PHYS/QHP 5/>YRS: CPT | Performed by: INTERNAL MEDICINE

## 2025-05-08 PROCEDURE — 250N000013 HC RX MED GY IP 250 OP 250 PS 637: Performed by: INTERNAL MEDICINE

## 2025-05-08 PROCEDURE — 258N000003 HC RX IP 258 OP 636: Performed by: NURSE PRACTITIONER

## 2025-05-08 PROCEDURE — 99232 SBSQ HOSP IP/OBS MODERATE 35: CPT | Performed by: INTERNAL MEDICINE

## 2025-05-08 PROCEDURE — C1887 CATHETER, GUIDING: HCPCS | Performed by: INTERNAL MEDICINE

## 2025-05-08 PROCEDURE — C1894 INTRO/SHEATH, NON-LASER: HCPCS | Performed by: INTERNAL MEDICINE

## 2025-05-08 PROCEDURE — 250N000011 HC RX IP 250 OP 636: Mod: JZ | Performed by: INTERNAL MEDICINE

## 2025-05-08 PROCEDURE — 250N000009 HC RX 250: Performed by: INTERNAL MEDICINE

## 2025-05-08 PROCEDURE — C1769 GUIDE WIRE: HCPCS | Performed by: INTERNAL MEDICINE

## 2025-05-08 PROCEDURE — B2111ZZ FLUOROSCOPY OF MULTIPLE CORONARY ARTERIES USING LOW OSMOLAR CONTRAST: ICD-10-PCS | Performed by: INTERNAL MEDICINE

## 2025-05-08 PROCEDURE — 93458 L HRT ARTERY/VENTRICLE ANGIO: CPT | Performed by: INTERNAL MEDICINE

## 2025-05-08 PROCEDURE — 255N000002 HC RX 255 OP 636: Performed by: INTERNAL MEDICINE

## 2025-05-08 PROCEDURE — 93458 L HRT ARTERY/VENTRICLE ANGIO: CPT | Mod: 26 | Performed by: INTERNAL MEDICINE

## 2025-05-08 PROCEDURE — 36415 COLL VENOUS BLD VENIPUNCTURE: CPT | Performed by: STUDENT IN AN ORGANIZED HEALTH CARE EDUCATION/TRAINING PROGRAM

## 2025-05-08 PROCEDURE — 85520 HEPARIN ASSAY: CPT | Performed by: STUDENT IN AN ORGANIZED HEALTH CARE EDUCATION/TRAINING PROGRAM

## 2025-05-08 PROCEDURE — 120N000001 HC R&B MED SURG/OB

## 2025-05-08 PROCEDURE — 272N000001 HC OR GENERAL SUPPLY STERILE: Performed by: INTERNAL MEDICINE

## 2025-05-08 PROCEDURE — 99222 1ST HOSP IP/OBS MODERATE 55: CPT | Mod: FS

## 2025-05-08 PROCEDURE — 99222 1ST HOSP IP/OBS MODERATE 55: CPT | Performed by: HOSPITALIST

## 2025-05-08 PROCEDURE — 4A023N7 MEASUREMENT OF CARDIAC SAMPLING AND PRESSURE, LEFT HEART, PERCUTANEOUS APPROACH: ICD-10-PCS | Performed by: INTERNAL MEDICINE

## 2025-05-08 RX ORDER — FENTANYL CITRATE 50 UG/ML
INJECTION, SOLUTION INTRAMUSCULAR; INTRAVENOUS
Status: DISCONTINUED | OUTPATIENT
Start: 2025-05-08 | End: 2025-05-08 | Stop reason: HOSPADM

## 2025-05-08 RX ORDER — NALOXONE HYDROCHLORIDE 0.4 MG/ML
0.4 INJECTION, SOLUTION INTRAMUSCULAR; INTRAVENOUS; SUBCUTANEOUS
Status: DISCONTINUED | OUTPATIENT
Start: 2025-05-08 | End: 2025-05-08 | Stop reason: HOSPADM

## 2025-05-08 RX ORDER — FENTANYL CITRATE 50 UG/ML
25 INJECTION, SOLUTION INTRAMUSCULAR; INTRAVENOUS
Status: DISCONTINUED | OUTPATIENT
Start: 2025-05-08 | End: 2025-05-08 | Stop reason: HOSPADM

## 2025-05-08 RX ORDER — IODIXANOL 320 MG/ML
INJECTION, SOLUTION INTRAVASCULAR
Status: DISCONTINUED | OUTPATIENT
Start: 2025-05-08 | End: 2025-05-08 | Stop reason: HOSPADM

## 2025-05-08 RX ORDER — OXYCODONE HYDROCHLORIDE 5 MG/1
10 TABLET ORAL EVERY 4 HOURS PRN
Status: DISCONTINUED | OUTPATIENT
Start: 2025-05-08 | End: 2025-05-08 | Stop reason: HOSPADM

## 2025-05-08 RX ORDER — CARVEDILOL 6.25 MG/1
6.25 TABLET ORAL 2 TIMES DAILY WITH MEALS
Status: DISCONTINUED | OUTPATIENT
Start: 2025-05-08 | End: 2025-05-08 | Stop reason: HOSPADM

## 2025-05-08 RX ORDER — NALOXONE HYDROCHLORIDE 0.4 MG/ML
0.2 INJECTION, SOLUTION INTRAMUSCULAR; INTRAVENOUS; SUBCUTANEOUS
Status: DISCONTINUED | OUTPATIENT
Start: 2025-05-08 | End: 2025-05-08 | Stop reason: HOSPADM

## 2025-05-08 RX ORDER — ASPIRIN 81 MG/1
243 TABLET, CHEWABLE ORAL ONCE
Status: DISCONTINUED | OUTPATIENT
Start: 2025-05-08 | End: 2025-05-08 | Stop reason: HOSPADM

## 2025-05-08 RX ORDER — LOSARTAN POTASSIUM 25 MG/1
25 TABLET ORAL DAILY
Qty: 90 TABLET | Refills: 3 | Status: SHIPPED | OUTPATIENT
Start: 2025-05-09

## 2025-05-08 RX ORDER — ACETAMINOPHEN 325 MG/1
650 TABLET ORAL EVERY 4 HOURS PRN
Status: DISCONTINUED | OUTPATIENT
Start: 2025-05-08 | End: 2025-05-08 | Stop reason: HOSPADM

## 2025-05-08 RX ORDER — ASPIRIN 81 MG/1
81 TABLET, CHEWABLE ORAL DAILY
Status: DISCONTINUED | OUTPATIENT
Start: 2025-05-09 | End: 2025-05-08 | Stop reason: HOSPADM

## 2025-05-08 RX ORDER — LOSARTAN POTASSIUM 25 MG/1
25 TABLET ORAL DAILY
Status: DISCONTINUED | OUTPATIENT
Start: 2025-05-08 | End: 2025-05-08 | Stop reason: HOSPADM

## 2025-05-08 RX ORDER — NITROGLYCERIN 0.4 MG/1
0.4 TABLET SUBLINGUAL EVERY 5 MIN PRN
Status: DISCONTINUED | OUTPATIENT
Start: 2025-05-08 | End: 2025-05-08 | Stop reason: HOSPADM

## 2025-05-08 RX ORDER — NITROGLYCERIN 0.4 MG/1
TABLET SUBLINGUAL
Qty: 10 TABLET | Refills: 3 | Status: SHIPPED | OUTPATIENT
Start: 2025-05-08

## 2025-05-08 RX ORDER — NALOXONE HYDROCHLORIDE 0.4 MG/ML
0.4 INJECTION, SOLUTION INTRAMUSCULAR; INTRAVENOUS; SUBCUTANEOUS
Status: DISCONTINUED | OUTPATIENT
Start: 2025-05-08 | End: 2025-05-08

## 2025-05-08 RX ORDER — OXYCODONE HYDROCHLORIDE 5 MG/1
5 TABLET ORAL EVERY 4 HOURS PRN
Status: DISCONTINUED | OUTPATIENT
Start: 2025-05-08 | End: 2025-05-08 | Stop reason: HOSPADM

## 2025-05-08 RX ORDER — SODIUM CHLORIDE 9 MG/ML
25 INJECTION, SOLUTION INTRAVENOUS CONTINUOUS
Status: DISCONTINUED | OUTPATIENT
Start: 2025-05-08 | End: 2025-05-08 | Stop reason: HOSPADM

## 2025-05-08 RX ORDER — CARVEDILOL 6.25 MG/1
6.25 TABLET ORAL 2 TIMES DAILY WITH MEALS
Qty: 180 TABLET | Refills: 3 | Status: SHIPPED | OUTPATIENT
Start: 2025-05-08

## 2025-05-08 RX ORDER — LIDOCAINE 40 MG/G
CREAM TOPICAL
Status: DISCONTINUED | OUTPATIENT
Start: 2025-05-08 | End: 2025-05-08 | Stop reason: HOSPADM

## 2025-05-08 RX ORDER — NALOXONE HYDROCHLORIDE 0.4 MG/ML
0.2 INJECTION, SOLUTION INTRAMUSCULAR; INTRAVENOUS; SUBCUTANEOUS
Status: DISCONTINUED | OUTPATIENT
Start: 2025-05-08 | End: 2025-05-08

## 2025-05-08 RX ORDER — ATORVASTATIN CALCIUM 80 MG/1
80 TABLET, FILM COATED ORAL EVERY EVENING
Qty: 90 TABLET | Refills: 3 | Status: SHIPPED | OUTPATIENT
Start: 2025-05-08

## 2025-05-08 RX ORDER — SODIUM CHLORIDE 9 MG/ML
INJECTION, SOLUTION INTRAVENOUS CONTINUOUS
Status: DISCONTINUED | OUTPATIENT
Start: 2025-05-08 | End: 2025-05-08 | Stop reason: HOSPADM

## 2025-05-08 RX ORDER — ASPIRIN 325 MG
325 TABLET ORAL ONCE
Status: DISCONTINUED | OUTPATIENT
Start: 2025-05-08 | End: 2025-05-08 | Stop reason: HOSPADM

## 2025-05-08 RX ORDER — FLUMAZENIL 0.1 MG/ML
0.2 INJECTION, SOLUTION INTRAVENOUS
Status: DISCONTINUED | OUTPATIENT
Start: 2025-05-08 | End: 2025-05-08 | Stop reason: HOSPADM

## 2025-05-08 RX ORDER — ATROPINE SULFATE 0.1 MG/ML
0.5 INJECTION INTRAVENOUS
Status: DISCONTINUED | OUTPATIENT
Start: 2025-05-08 | End: 2025-05-08 | Stop reason: HOSPADM

## 2025-05-08 RX ORDER — ATORVASTATIN CALCIUM 40 MG/1
80 TABLET, FILM COATED ORAL EVERY EVENING
Status: DISCONTINUED | OUTPATIENT
Start: 2025-05-08 | End: 2025-05-08 | Stop reason: HOSPADM

## 2025-05-08 RX ORDER — CLOPIDOGREL BISULFATE 75 MG/1
75 TABLET ORAL ONCE
Status: COMPLETED | OUTPATIENT
Start: 2025-05-08 | End: 2025-05-08

## 2025-05-08 RX ORDER — ASPIRIN 81 MG/1
81 TABLET ORAL ONCE
Status: COMPLETED | OUTPATIENT
Start: 2025-05-08 | End: 2025-05-08

## 2025-05-08 RX ORDER — NITROGLYCERIN 0.4 MG/1
TABLET SUBLINGUAL
Qty: 10 TABLET | Refills: 3 | Status: SHIPPED | OUTPATIENT
Start: 2025-05-08 | End: 2025-05-08

## 2025-05-08 RX ORDER — SODIUM CHLORIDE 9 MG/ML
75 INJECTION, SOLUTION INTRAVENOUS CONTINUOUS
Status: ACTIVE | OUTPATIENT
Start: 2025-05-08 | End: 2025-05-08

## 2025-05-08 RX ORDER — DIAZEPAM 10 MG/1
10 TABLET ORAL
Status: COMPLETED | OUTPATIENT
Start: 2025-05-08 | End: 2025-05-08

## 2025-05-08 RX ORDER — HYDRALAZINE HYDROCHLORIDE 20 MG/ML
10 INJECTION INTRAMUSCULAR; INTRAVENOUS
Status: DISCONTINUED | OUTPATIENT
Start: 2025-05-08 | End: 2025-05-08 | Stop reason: HOSPADM

## 2025-05-08 RX ADMIN — LOSARTAN POTASSIUM 25 MG: 25 TABLET, FILM COATED ORAL at 10:27

## 2025-05-08 RX ADMIN — DIAZEPAM 10 MG: 10 TABLET ORAL at 07:22

## 2025-05-08 RX ADMIN — CLOPIDOGREL 75 MG: 75 TABLET ORAL at 07:22

## 2025-05-08 RX ADMIN — SODIUM CHLORIDE: 0.9 INJECTION, SOLUTION INTRAVENOUS at 07:32

## 2025-05-08 RX ADMIN — ASPIRIN 81 MG CHEWABLE TABLET 81 MG: 81 TABLET CHEWABLE at 07:22

## 2025-05-08 RX ADMIN — CARVEDILOL 6.25 MG: 6.25 TABLET, FILM COATED ORAL at 10:28

## 2025-05-08 ASSESSMENT — ACTIVITIES OF DAILY LIVING (ADL)
ADLS_ACUITY_SCORE: 43
ADLS_ACUITY_SCORE: 43
ADLS_ACUITY_SCORE: 42
ADLS_ACUITY_SCORE: 42
ADLS_ACUITY_SCORE: 43
ADLS_ACUITY_SCORE: 42
ADLS_ACUITY_SCORE: 41
ADLS_ACUITY_SCORE: 42
ADLS_ACUITY_SCORE: 42

## 2025-05-08 ASSESSMENT — EJECTION FRACTION: EF_VALUE: .22

## 2025-05-08 NOTE — PLAN OF CARE
Problem: Pain Acute  Goal: Optimal Pain Control and Function  5/7/2025 2113 by Janneth Ellis, RN  Outcome: Progressing    Problem: Adult Inpatient Plan of Care  Goal: Optimal Comfort and Wellbeing  5/7/2025 2113 by Janneth Ellis, RN  Outcome: Progressing     Goal Outcome Evaluation:  A/Ox4, tele - NSR, on RA, on regular diet with good appetite, with colostomy on his LLQ of his abdomen, voiding freely - urinal provided at bedside. Initial assessment with chest pain score of 3/10, however according to the pt it is better compared than earlier. Informed MD on call with reply to observe since the score is 3/10 only & scheduled for Coronary Angiography at [facility] tomorrow. Heparin resumed at 500 units/hr. NPO post midnight started & maintained, re instructed the pt. CHG bathing done. Repeated Anti Xa at 0152H: 0.11 - heparin bolus given & increased rate to 650 units/hr according to protocol. CHG bathing done. For CT angiogarphy today at [facility] at 0830H, transportation scheduled between 0555H - 0655H. Endorsed to EMS staff & left at 0600H to [facility] with ongoing heparin infusion & together with his belongings.

## 2025-05-08 NOTE — PLAN OF CARE
Goal Outcome Evaluation:         D/I/A: Patient is tolerating liquids and foods, ambulating, urinating, puncture site are stable (no bleeding or hematoma) and patient has a .   A+O x 4 and making needs known.  CCL access sites C/D/I with no bleeding or hematoma.  CMS adeq.  VSSA.     Education completed and outlined in AVS.  Medication changes reviewed with patient. Questions answered prior to discharge. Belongings returned to patient at discharge  P: Discharged to self care.  Patient to follow up as per discharge instruction.    Pt awaiting his wife to come and take him home.    Care handed over to Rojelio German RN

## 2025-05-08 NOTE — Clinical Note
Pre-calculated contrast dose 310 ml No Residual Tumor Seen Histology Text: There were no malignant cells seen in the sections examined.

## 2025-05-08 NOTE — PHARMACY-ADMISSION MEDICATION HISTORY
Admission medication history completed at [facility]. Please see Pharmacist Admission Medication History note from 05/06/2025.

## 2025-05-08 NOTE — INTERVAL H&P NOTE
I have reviewed the surgical (or preoperative) H&P that is linked to this encounter, and examined the patient. There are no significant changes    Had CP at 0200, but none since  Labs from May 6 reviewed and are ok    Clinical Conditions Present on Arrival:  Clinically Significant Risk Factors Present on Admission       # Drug Induced Platelet Defect: home medication list includes an antiplatelet medication

## 2025-05-08 NOTE — PLAN OF CARE
Goal Outcome Evaluation:         Pt transferred from  [facility] to [facility] for CA/P.PCi. Pt prepped and sent for procedure. Pt's wife not here but signed up for texting.     Virginie German RN                      Length To Time In Minutes Device Was In Place: 10

## 2025-05-08 NOTE — CONSULTS
[facility]  Consult Note - Hospitalist Service  Date of Admission:  5/8/2025  Consult Requested by: Dr. Beard, cardiology team  Reason for Consult: Medical co-management     Assessment & Plan   Liborio Finch is a 64 year old male admitted on 5/8/2025. PMHx of CAD, STEMI 2022 s/p PCI LAD, ischemic cardiomyopathy (LVEF >60% 5/2023), HTN, HLD, PAD s/p left femoral endarterectomy and iliac stent 10/2022, RA, colon cancer s/p colostomy in remission, former tobacco use. He was transferred from [facility] for a coronary angiogram today. He is doing well post-procedure.     **Okay to discharge from [facility] standpoint**    Chest Pain  Known CAD with STEMI 2022 s/p PCI LAD  Ischemic cardiomyopathy LVEF >60% 5/2023  Hypertension  Hyperlipidemia  Concern for unstable angina  -- Patient had coronary angiogram today  -- Remainder of care and recommendations per Cardiology    Rheumatoid Arthritis -- Continue PTA sulfasalazine    Colon Cancer s/p Colostomy in remission -- continue to follow with oncology as scheduled    GERD -- Continue PTA Omeprazole       The patient's care was discussed with the Attending Physician, Dr. Jorgensen who independently met with and assessed the patient and is agreement with the assessment and plan.  Additionally, care discussed with patient      Clinically Significant Risk Factors Present on Admission                 # Drug Induced Platelet Defect: home medication list includes an antiplatelet medication                        Argelia Us PA-C  Hospitalist Service  Securely message with [employer] (more info)  Text page via [employer] Paging/Directory   ______________________________________________________________________    Chief Complaint   No chief complaint on file.    History is obtained from the patient    History of Present Illness   Liborio Finch is a 64 year old male who was admitted to [facility] for chest pain with concern for unstable angina. Patient had coronary  angiography today and is doing well post-procedure. Denies SOB or chest pain. Does not have concerns with discharge at this time.       Past Medical History    History reviewed. No pertinent past medical history.    Past Surgical History   Past Surgical History:   Procedure Laterality Date    APPENDECTOMY      COLON SURGERY      IR LOWER EXTREMITY ANGIOGRAM LEFT  10/13/2022       Medications   I have reviewed this patient's current medications       Review of Systems    The 5 point Review of Systems is negative other than noted in the HPI or here.     Social History   I have reviewed this patient's social history and updated it with pertinent information if needed.  Social History     Tobacco Use    Smoking status: Former     Current packs/day: 0.50     Types: Cigarettes    Smokeless tobacco: Never   Substance Use Topics    Alcohol use: Not Currently     Alcohol/week: 24.0 standard drinks of alcohol     Types: 24 Standard drinks or equivalent per week    Drug use: No     Family History   I have reviewed this patient's family history and updated it with pertinent information if needed.  Family History   Problem Relation Age of Onset    Asthma Mother     Asthma Sister     Cerebrovascular Disease Maternal Grandfather      Allergies   No Known Allergies     Physical Exam   Vital Signs: Temp: 97.6  F (36.4  C) Temp src: Oral BP: 128/68 Pulse: 68   Resp: 14 SpO2: 96 % O2 Device: None (Room air) Oxygen Delivery: 2 LPM  Weight: 142 lbs 6.4 oz    Physical Exam  GENERAL: Well-developed, well-nourished older male in no apparent distress.   RESPIRATORY: CTAB. Good inspiratory effort.   CARDIOVASCULAR: RRR, no murmurs gallops or rubs. S1/S2 present.   EXTREMITIES: No lower extremity edema.   NEURO: Alert and oriented x 3.   PSYCH: Appropriate mood and affect. Pleasant and cooperative.       Medical Decision Making       35 MINUTES SPENT BY ME on the date of service doing chart review, history, exam, documentation & further  activities per the note.      Data   ------------------------- PAST 24 HR DATA REVIEWED -----------------------------------------------        Imaging results reviewed over the past 24 hrs:   Recent Results (from the past 24 hours)   Cardiac Catheterization    Narrative      Prox LAD to Mid LAD lesion is 20% stenosed.    2nd Mrg lesion is 20% stenosed.    Prox RCA lesion is 30% stenosed.    1.  Patent stent in proximal LAD with about 20% in-stent narrowing.  Good   residual lumen.  2.  LCx had mild lumen irregularity in the major OM branch.  No stenoses.  3.  Dominant RCA with 20-30% stenosis narrowing mid segment.  No severe   stenoses.  4.  LVEDP = 9 mmHg.  5.  Compared to report from previous study of 2023 from [facility]   the current results appear unchanged.

## 2025-05-08 NOTE — Clinical Note
The DP pulses are detected w/ doppler bilaterally. The PT pulses are detected w/ doppler bilaterally. The radial pulses are 1+ bilaterally.

## 2025-05-08 NOTE — DISCHARGE INSTRUCTIONS

## 2025-05-08 NOTE — DISCHARGE SUMMARY
HEART CARE ENCOUNTER NOTE        INTERVENTIONAL CARDIOLOGY DISCHARGE SUMMARY    Primary Care Physician:  Gen Melendez    Discharge Provider: GINA MORALES PA-C     Admission Date: 5/8/2025. Admission Diagnoses: Unstable angina pectoris (H) [I20.0]   Discharge Date: May 8, 2025   Disposition: Home   Condition at Discharge: Stable  Code Status: Full Code     Principal Diagnosis:  CAD    Discharge Diagnoses:  Active Problems:    Coronary atherosclerosis    Dyslipidemia    History of colon cancer    History of ST elevation myocardial infarction (STEMI)    Peripheral arterial disease    Rheumatoid arthritis, adult (H)    Stented coronary artery    Unstable angina pectoris (H)      Consult/s: hospital medicine  Significant Diagnostic Studies:   CORONARY ANGIOGRAM 5/8/25:    Prox LAD to Mid LAD lesion is 20% stenosed.    2nd Mrg lesion is 20% stenosed.    Prox RCA lesion is 30% stenosed.     1.  Patent stent in proximal LAD with about 20% in-stent narrowing.  Good residual lumen.  2.  LCx had mild lumen irregularity in the major OM branch.  No stenoses.  3.  Dominant RCA with 20-30% stenosis narrowing mid segment.  No severe stenoses.  4.  LVEDP = 9 mmHg.  5.  Compared to report from previous study of 2023 from [facility] the current results appear unchanged.         Treatments: Coronary artery stent    Discharge Medications:   Current Discharge Medication List        START taking these medications    Details   atorvastatin (LIPITOR) 80 MG tablet Take 1 tablet (80 mg) by mouth every evening.  Qty: 90 tablet, Refills: 3    Associated Diagnoses: History of ST elevation myocardial infarction (STEMI); Stented coronary artery      carvedilol (COREG) 6.25 MG tablet Take 1 tablet (6.25 mg) by mouth 2 times daily (with meals).  Qty: 180 tablet, Refills: 3    Associated Diagnoses: History of ST elevation myocardial infarction (STEMI); Stented coronary artery      losartan (COZAAR) 25 MG tablet Take 1 tablet (25 mg) by  mouth daily.  Qty: 90 tablet, Refills: 3    Associated Diagnoses: Benign essential hypertension      nitroGLYcerin (NITROSTAT) 0.4 MG sublingual tablet For chest pain place 1 tablet under the tongue every 5 minutes for 3 doses. If symptoms persist 5 minutes after 1st dose call 911.  Qty: 10 tablet, Refills: 3    Associated Diagnoses: History of ST elevation myocardial infarction (STEMI); Stented coronary artery           CONTINUE these medications which have NOT CHANGED    Details   aspirin 81 MG EC tablet Take 81 mg by mouth daily.      clopidogrel (PLAVIX) 75 MG tablet Take 75 mg by mouth daily.      magnesium oxide (MAG-OX) 400 MG tablet Take 400 mg by mouth daily.      omeprazole (PRILOSEC) 20 MG capsule [OMEPRAZOLE (PRILOSEC) 20 MG CAPSULE] Take 20 mg by mouth daily before breakfast.      sulfaSALAzine (AZULFIDINE) 500 MG tablet Take 1,500 mg by mouth 2 times daily.             Discharge Instructions:  Follow up appointment with cardiology PA-C at [facility]     Diet: Regular diet. Increase fluids over the next 2 days.   Activity: Activity as tolerated   Restrictions: No lifting >10 lbs or vigorous exercise for 5 days. No driving for 1 day.   Wound / drain care: OK to shower next day. Keep wound clean and dry. Ok to use Ice packs PRN for discomfort. No baths, hot tubs or swimming pools for 5 days.    Hospital Summary:   Mr. Liborio Finch is a 64 year old male with past medical history of HTN, CAD s/p NSTEMI with PCI to LAD in 2022, moderate RCA stenosis, HLD, PAD, RA, colon cancer s/p colostomy and former smoker who underwent successful coronary angiogram today.  Disease looked similar to angiogram done at [facility] in 2023.      The patient was routinely monitored in [facility] post procedure.  He was seen by myself and the hospitalist and felt okay for discharge to home.  Post operative course was uncomplicated and right radial vascular access site remained stable prior to discharge.  Post procedure the  "patient denied chest pain/pressure, palpitations, or shortness of breath.       Activity restrictions and reportable signs and symptoms were discussed with the patient who verbalizes understanding.  The patient will continue on DAPT with ASA and Plavix as well as intensive statin therapy with atorvastatin.     Follow up is arranged as above.     Physical Examination   /70   Pulse 95   Temp 97.6  F (36.4  C) (Oral)   Resp 21   Ht 1.676 m (5' 6\")   Wt 64.6 kg (142 lb 6.4 oz)   SpO2 98%   BMI 22.98 kg/m    Body mass index is 22.98 kg/m .  Wt Readings from Last 3 Encounters:   05/08/25 64.6 kg (142 lb 6.4 oz)   05/08/25 64.6 kg (142 lb 6.4 oz)   04/04/19 64.2 kg (141 lb 8 oz)       Intake/Output Summary (Last 24 hours) at 5/8/2025 1335  Last data filed at 5/8/2025 1100  Gross per 24 hour   Intake 500 ml   Output --   Net 500 ml     General Appearance:   no distress, normal body habitus   Chest/Lungs:   Normal respiratory effort. Respirations are even and unlabored. Lungs are clear to auscultation, no rales or wheezing. No chest wall tenderness.    Cardiovascular:   Regular. Normal S1, S2 with no murmurs, rubs, or gallops; the carotid, radial, dorsalis pedis and posterior tibial pulses are intact   Abdomen:  soft, non-tender to palpation, non-distended. + bowel sounds.   Extremities: No edema    Skin: Right groin site WNL; Stitch in place   Neurologic: Alert and oriented x3, calm and able to move all 4 extremities appropriately            Lab Results    Chemistry/lipid CBC Cardiac Enzymes/BNP/TSH/INR   Creatinine   Date Value Ref Range Status   05/06/2025 1.01 0.67 - 1.17 mg/dL Final      Lab Results   Component Value Date    WBC 6.8 05/06/2025     Lab Results   Component Value Date    RBC 4.03 05/06/2025     Lab Results   Component Value Date    HGB 12.5 05/06/2025     Lab Results   Component Value Date    HCT 36.5 05/06/2025     No components found for: \"MCT\"  Lab Results   Component Value Date    MCV 91 " 05/06/2025     Lab Results   Component Value Date    MCH 31.0 05/06/2025     Lab Results   Component Value Date    MCHC 34.2 05/06/2025     Lab Results   Component Value Date    RDW 15.0 05/06/2025     Lab Results   Component Value Date     05/06/2025      Lab Results   Component Value Date    TSH 4.08 05/06/2025

## 2025-05-11 ENCOUNTER — PATIENT OUTREACH (OUTPATIENT)
Dept: CARE COORDINATION | Facility: CLINIC | Age: 65
End: 2025-05-11
Payer: COMMERCIAL

## 2025-05-11 NOTE — PROGRESS NOTES
Connected Care Resource Center Contact  [facility]/Voicemail     Clinical Data: Post-Discharge Outreach     Outreach attempted x 2.  Left message on patient's voicemail, providing [facility]'s central phone number of [phone] ([phone]) for questions/concerns and/or to schedule an appt with an [facility] provider, if they do not have a PCP.      Plan:  [facility] will do no further outreaches at this time.       Shara Adames MA  Connected Care Resource Center, [facility]    *Connected Care Resource Team does NOT follow patient ongoing. Referrals are identified based on internal discharge reports and the outreach is to ensure patient has an understanding of their discharge instructions.

## 2025-08-03 ENCOUNTER — HEALTH MAINTENANCE LETTER (OUTPATIENT)
Age: 65
End: 2025-08-03

## (undated) DEVICE — SHTH INTRO 0.021IN ID 6FR DIA

## (undated) DEVICE — EXCHANGE WIRE .035 260 STAR/JFC/035/260/ M001491681

## (undated) DEVICE — KIT HAND CONTROL ACIST 014644 AR-P54

## (undated) DEVICE — CATH DIAGNOSTIC RADIAL 5FR TIG 4.0

## (undated) DEVICE — SLEEVE TR BAND RADIAL COMPRESSION DEVICE 24CM TRB24-REG

## (undated) DEVICE — ELECTRODE DEFIB CADENCE 22550R

## (undated) DEVICE — MANIFOLD KIT ANGIO AUTOMATED 014613

## (undated) DEVICE — SYR ANGIOGRAPHY MULTIUSE KIT ACIST 014612

## (undated) DEVICE — CUSTOM PACK CORONARY SAN5BCRHEA

## (undated) RX ORDER — DIAZEPAM 10 MG/1
TABLET ORAL
Status: DISPENSED
Start: 2025-05-08

## (undated) RX ORDER — CLOPIDOGREL BISULFATE 75 MG/1
TABLET ORAL
Status: DISPENSED
Start: 2025-05-08

## (undated) RX ORDER — ASPIRIN 81 MG/1
TABLET, CHEWABLE ORAL
Status: DISPENSED
Start: 2025-05-08

## (undated) RX ORDER — FENTANYL CITRATE 50 UG/ML
INJECTION, SOLUTION INTRAMUSCULAR; INTRAVENOUS
Status: DISPENSED
Start: 2025-05-08